# Patient Record
Sex: FEMALE | Race: OTHER | HISPANIC OR LATINO | URBAN - METROPOLITAN AREA
[De-identification: names, ages, dates, MRNs, and addresses within clinical notes are randomized per-mention and may not be internally consistent; named-entity substitution may affect disease eponyms.]

---

## 2018-01-28 ENCOUNTER — INPATIENT (INPATIENT)
Facility: HOSPITAL | Age: 43
LOS: 1 days | Discharge: HOME | DRG: 342 | End: 2018-01-30
Attending: SURGERY | Admitting: SURGERY
Payer: MEDICAID

## 2018-01-28 VITALS
HEART RATE: 100 BPM | DIASTOLIC BLOOD PRESSURE: 82 MMHG | TEMPERATURE: 99 F | SYSTOLIC BLOOD PRESSURE: 153 MMHG | RESPIRATION RATE: 18 BRPM | OXYGEN SATURATION: 98 %

## 2018-01-28 DIAGNOSIS — R10.9 UNSPECIFIED ABDOMINAL PAIN: ICD-10-CM

## 2018-01-28 LAB
ALBUMIN SERPL ELPH-MCNC: 4.1 G/DL — SIGNIFICANT CHANGE UP (ref 3.5–5)
ALP SERPL-CCNC: 139 U/L — HIGH (ref 40–120)
ALT FLD-CCNC: 94 U/L DA — HIGH (ref 10–60)
ANION GAP SERPL CALC-SCNC: 9 MMOL/L — SIGNIFICANT CHANGE UP (ref 5–17)
APPEARANCE UR: CLEAR — SIGNIFICANT CHANGE UP
AST SERPL-CCNC: 46 U/L — HIGH (ref 10–40)
BILIRUB SERPL-MCNC: 0.3 MG/DL — SIGNIFICANT CHANGE UP (ref 0.2–1.2)
BILIRUB UR-MCNC: NEGATIVE — SIGNIFICANT CHANGE UP
BUN SERPL-MCNC: 9 MG/DL — SIGNIFICANT CHANGE UP (ref 7–18)
CALCIUM SERPL-MCNC: 9.2 MG/DL — SIGNIFICANT CHANGE UP (ref 8.4–10.5)
CHLORIDE SERPL-SCNC: 101 MMOL/L — SIGNIFICANT CHANGE UP (ref 96–108)
CO2 SERPL-SCNC: 26 MMOL/L — SIGNIFICANT CHANGE UP (ref 22–31)
COLOR SPEC: YELLOW — SIGNIFICANT CHANGE UP
CREAT SERPL-MCNC: 0.59 MG/DL — SIGNIFICANT CHANGE UP (ref 0.5–1.3)
DIFF PNL FLD: ABNORMAL
GLUCOSE SERPL-MCNC: 92 MG/DL — SIGNIFICANT CHANGE UP (ref 70–99)
GLUCOSE UR QL: NEGATIVE — SIGNIFICANT CHANGE UP
HCG SERPL-ACNC: <1 MIU/ML — SIGNIFICANT CHANGE UP
HCG UR QL: NEGATIVE — SIGNIFICANT CHANGE UP
HCT VFR BLD CALC: 42 % — SIGNIFICANT CHANGE UP (ref 34.5–45)
HGB BLD-MCNC: 13.2 G/DL — SIGNIFICANT CHANGE UP (ref 11.5–15.5)
KETONES UR-MCNC: NEGATIVE — SIGNIFICANT CHANGE UP
LEUKOCYTE ESTERASE UR-ACNC: ABNORMAL
LIDOCAIN IGE QN: 100 U/L — SIGNIFICANT CHANGE UP (ref 73–393)
MCHC RBC-ENTMCNC: 29 PG — SIGNIFICANT CHANGE UP (ref 27–34)
MCHC RBC-ENTMCNC: 31.4 GM/DL — LOW (ref 32–36)
MCV RBC AUTO: 92.4 FL — SIGNIFICANT CHANGE UP (ref 80–100)
NITRITE UR-MCNC: NEGATIVE — SIGNIFICANT CHANGE UP
PH UR: 6 — SIGNIFICANT CHANGE UP (ref 5–8)
PLATELET # BLD AUTO: 371 K/UL — SIGNIFICANT CHANGE UP (ref 150–400)
POTASSIUM SERPL-MCNC: 4.1 MMOL/L — SIGNIFICANT CHANGE UP (ref 3.5–5.3)
POTASSIUM SERPL-SCNC: 4.1 MMOL/L — SIGNIFICANT CHANGE UP (ref 3.5–5.3)
PROT SERPL-MCNC: 8.6 G/DL — HIGH (ref 6–8.3)
PROT UR-MCNC: NEGATIVE — SIGNIFICANT CHANGE UP
RBC # BLD: 4.54 M/UL — SIGNIFICANT CHANGE UP (ref 3.8–5.2)
RBC # FLD: 12.1 % — SIGNIFICANT CHANGE UP (ref 10.3–14.5)
SODIUM SERPL-SCNC: 136 MMOL/L — SIGNIFICANT CHANGE UP (ref 135–145)
SP GR SPEC: 1.02 — SIGNIFICANT CHANGE UP (ref 1.01–1.02)
UROBILINOGEN FLD QL: NEGATIVE — SIGNIFICANT CHANGE UP
WBC # BLD: 12.9 K/UL — HIGH (ref 3.8–10.5)
WBC # FLD AUTO: 12.9 K/UL — HIGH (ref 3.8–10.5)

## 2018-01-28 PROCEDURE — 74176 CT ABD & PELVIS W/O CONTRAST: CPT | Mod: 26

## 2018-01-28 PROCEDURE — 76830 TRANSVAGINAL US NON-OB: CPT | Mod: 26

## 2018-01-28 PROCEDURE — 99285 EMERGENCY DEPT VISIT HI MDM: CPT

## 2018-01-28 PROCEDURE — 76856 US EXAM PELVIC COMPLETE: CPT | Mod: 26

## 2018-01-28 RX ORDER — MORPHINE SULFATE 50 MG/1
4 CAPSULE, EXTENDED RELEASE ORAL ONCE
Qty: 0 | Refills: 0 | Status: DISCONTINUED | OUTPATIENT
Start: 2018-01-28 | End: 2018-01-28

## 2018-01-28 RX ORDER — SODIUM CHLORIDE 9 MG/ML
1000 INJECTION INTRAMUSCULAR; INTRAVENOUS; SUBCUTANEOUS ONCE
Qty: 0 | Refills: 0 | Status: COMPLETED | OUTPATIENT
Start: 2018-01-28 | End: 2018-01-28

## 2018-01-28 RX ORDER — KETOROLAC TROMETHAMINE 30 MG/ML
15 SYRINGE (ML) INJECTION ONCE
Qty: 0 | Refills: 0 | Status: DISCONTINUED | OUTPATIENT
Start: 2018-01-28 | End: 2018-01-28

## 2018-01-28 RX ORDER — HYDROMORPHONE HYDROCHLORIDE 2 MG/ML
1 INJECTION INTRAMUSCULAR; INTRAVENOUS; SUBCUTANEOUS ONCE
Qty: 0 | Refills: 0 | Status: DISCONTINUED | OUTPATIENT
Start: 2018-01-28 | End: 2018-01-28

## 2018-01-28 RX ORDER — MORPHINE SULFATE 50 MG/1
8 CAPSULE, EXTENDED RELEASE ORAL ONCE
Qty: 0 | Refills: 0 | Status: DISCONTINUED | OUTPATIENT
Start: 2018-01-28 | End: 2018-01-28

## 2018-01-28 RX ORDER — IBUPROFEN 200 MG
400 TABLET ORAL EVERY 4 HOURS
Qty: 0 | Refills: 0 | Status: DISCONTINUED | OUTPATIENT
Start: 2018-01-28 | End: 2018-01-30

## 2018-01-28 RX ORDER — ACETAMINOPHEN 500 MG
650 TABLET ORAL EVERY 6 HOURS
Qty: 0 | Refills: 0 | Status: DISCONTINUED | OUTPATIENT
Start: 2018-01-28 | End: 2018-01-30

## 2018-01-28 RX ORDER — ONDANSETRON 8 MG/1
4 TABLET, FILM COATED ORAL ONCE
Qty: 0 | Refills: 0 | Status: COMPLETED | OUTPATIENT
Start: 2018-01-28 | End: 2018-01-28

## 2018-01-28 RX ORDER — HYDROMORPHONE HYDROCHLORIDE 2 MG/ML
1 INJECTION INTRAMUSCULAR; INTRAVENOUS; SUBCUTANEOUS EVERY 4 HOURS
Qty: 0 | Refills: 0 | Status: DISCONTINUED | OUTPATIENT
Start: 2018-01-28 | End: 2018-01-30

## 2018-01-28 RX ADMIN — HYDROMORPHONE HYDROCHLORIDE 1 MILLIGRAM(S): 2 INJECTION INTRAMUSCULAR; INTRAVENOUS; SUBCUTANEOUS at 22:00

## 2018-01-28 RX ADMIN — MORPHINE SULFATE 4 MILLIGRAM(S): 50 CAPSULE, EXTENDED RELEASE ORAL at 16:18

## 2018-01-28 RX ADMIN — MORPHINE SULFATE 4 MILLIGRAM(S): 50 CAPSULE, EXTENDED RELEASE ORAL at 20:00

## 2018-01-28 RX ADMIN — Medication 15 MILLIGRAM(S): at 17:26

## 2018-01-28 RX ADMIN — SODIUM CHLORIDE 1000 MILLILITER(S): 9 INJECTION INTRAMUSCULAR; INTRAVENOUS; SUBCUTANEOUS at 15:55

## 2018-01-28 RX ADMIN — HYDROMORPHONE HYDROCHLORIDE 1 MILLIGRAM(S): 2 INJECTION INTRAMUSCULAR; INTRAVENOUS; SUBCUTANEOUS at 23:14

## 2018-01-28 RX ADMIN — MORPHINE SULFATE 4 MILLIGRAM(S): 50 CAPSULE, EXTENDED RELEASE ORAL at 19:28

## 2018-01-28 RX ADMIN — MORPHINE SULFATE 4 MILLIGRAM(S): 50 CAPSULE, EXTENDED RELEASE ORAL at 15:55

## 2018-01-28 RX ADMIN — Medication 15 MILLIGRAM(S): at 16:18

## 2018-01-28 RX ADMIN — HYDROMORPHONE HYDROCHLORIDE 1 MILLIGRAM(S): 2 INJECTION INTRAMUSCULAR; INTRAVENOUS; SUBCUTANEOUS at 21:06

## 2018-01-28 RX ADMIN — HYDROMORPHONE HYDROCHLORIDE 1 MILLIGRAM(S): 2 INJECTION INTRAMUSCULAR; INTRAVENOUS; SUBCUTANEOUS at 20:26

## 2018-01-28 NOTE — ED PROVIDER NOTE - MEDICAL DECISION MAKING DETAILS
Pt is a 43 y/o F with PMHx of kidney stones presenting with LLQ pain, possible torsion/kidney stone/UC, will check labs, US, and reassess.

## 2018-01-28 NOTE — ED PROVIDER NOTE - OBJECTIVE STATEMENT
Pt is a 43 y/o F with a significant PMHx of kidney stones and no significant PSHx presents to the ED c/o LLQ since yesterday evening. Pt states back pain radiating to the umbilicus. Pt reports nausea but denies v/d other symptoms or complaints. NKDA or allergies:

## 2018-01-28 NOTE — CONSULT NOTE ADULT - PROBLEM SELECTOR RECOMMENDATION 9
a/p 41 y/o with LLQ pain x2 days, stable afebrile  no acute gyn issue  medical mngt as per ER   thank you for consult  patient was advised to make appt with her GYN   d/w Dr. Torres

## 2018-01-28 NOTE — ED ADULT NURSE NOTE - OBJECTIVE STATEMENT
As per pt, "I have been having pain from my belly button to my groin. I also have back pain." Denies urinary problems.

## 2018-01-28 NOTE — ED PROVIDER NOTE - MUSCULOSKELETAL, MLM
Spine appears normal, range of motion is not limited, no muscle or joint tenderness, no leg swelling

## 2018-01-29 ENCOUNTER — RESULT REVIEW (OUTPATIENT)
Age: 43
End: 2018-01-29

## 2018-01-29 ENCOUNTER — TRANSCRIPTION ENCOUNTER (OUTPATIENT)
Age: 43
End: 2018-01-29

## 2018-01-29 DIAGNOSIS — R10.32 LEFT LOWER QUADRANT PAIN: ICD-10-CM

## 2018-01-29 DIAGNOSIS — J81.1 CHRONIC PULMONARY EDEMA: ICD-10-CM

## 2018-01-29 DIAGNOSIS — N20.0 CALCULUS OF KIDNEY: ICD-10-CM

## 2018-01-29 DIAGNOSIS — K35.80 UNSPECIFIED ACUTE APPENDICITIS: ICD-10-CM

## 2018-01-29 DIAGNOSIS — Z29.9 ENCOUNTER FOR PROPHYLACTIC MEASURES, UNSPECIFIED: ICD-10-CM

## 2018-01-29 LAB
ALBUMIN SERPL ELPH-MCNC: 3.6 G/DL — SIGNIFICANT CHANGE UP (ref 3.5–5)
ALP SERPL-CCNC: 121 U/L — HIGH (ref 40–120)
ALT FLD-CCNC: 76 U/L DA — HIGH (ref 10–60)
AMPHET UR-MCNC: NEGATIVE — SIGNIFICANT CHANGE UP
ANION GAP SERPL CALC-SCNC: 8 MMOL/L — SIGNIFICANT CHANGE UP (ref 5–17)
APTT BLD: 27.9 SEC — SIGNIFICANT CHANGE UP (ref 27.5–37.4)
AST SERPL-CCNC: 33 U/L — SIGNIFICANT CHANGE UP (ref 10–40)
BARBITURATES UR SCN-MCNC: NEGATIVE — SIGNIFICANT CHANGE UP
BENZODIAZ UR-MCNC: POSITIVE
BILIRUB SERPL-MCNC: 0.6 MG/DL — SIGNIFICANT CHANGE UP (ref 0.2–1.2)
BUN SERPL-MCNC: 13 MG/DL — SIGNIFICANT CHANGE UP (ref 7–18)
CALCIUM SERPL-MCNC: 8.9 MG/DL — SIGNIFICANT CHANGE UP (ref 8.4–10.5)
CHLORIDE SERPL-SCNC: 100 MMOL/L — SIGNIFICANT CHANGE UP (ref 96–108)
CHOLEST SERPL-MCNC: 182 MG/DL — SIGNIFICANT CHANGE UP (ref 10–199)
CO2 SERPL-SCNC: 26 MMOL/L — SIGNIFICANT CHANGE UP (ref 22–31)
COCAINE METAB.OTHER UR-MCNC: NEGATIVE — SIGNIFICANT CHANGE UP
CREAT SERPL-MCNC: 0.47 MG/DL — LOW (ref 0.5–1.3)
GLUCOSE SERPL-MCNC: 120 MG/DL — HIGH (ref 70–99)
HBA1C BLD-MCNC: 5.6 % — SIGNIFICANT CHANGE UP (ref 4–5.6)
HCT VFR BLD CALC: 37.8 % — SIGNIFICANT CHANGE UP (ref 34.5–45)
HCV AB S/CO SERPL IA: 0.39 S/CO — SIGNIFICANT CHANGE UP
HCV AB SERPL-IMP: SIGNIFICANT CHANGE UP
HDLC SERPL-MCNC: 42 MG/DL — SIGNIFICANT CHANGE UP (ref 40–125)
HGB BLD-MCNC: 11.9 G/DL — SIGNIFICANT CHANGE UP (ref 11.5–15.5)
HIV 1+2 AB+HIV1 P24 AG SERPL QL IA: SIGNIFICANT CHANGE UP
INR BLD: 1.11 RATIO — SIGNIFICANT CHANGE UP (ref 0.88–1.16)
LIPID PNL WITH DIRECT LDL SERPL: 126 MG/DL — SIGNIFICANT CHANGE UP
MAGNESIUM SERPL-MCNC: 1.9 MG/DL — SIGNIFICANT CHANGE UP (ref 1.6–2.6)
MCHC RBC-ENTMCNC: 28.5 PG — SIGNIFICANT CHANGE UP (ref 27–34)
MCHC RBC-ENTMCNC: 31.5 GM/DL — LOW (ref 32–36)
MCV RBC AUTO: 90.5 FL — SIGNIFICANT CHANGE UP (ref 80–100)
METHADONE UR-MCNC: NEGATIVE — SIGNIFICANT CHANGE UP
OPIATES UR-MCNC: NEGATIVE — SIGNIFICANT CHANGE UP
PCP SPEC-MCNC: SIGNIFICANT CHANGE UP
PCP UR-MCNC: NEGATIVE — SIGNIFICANT CHANGE UP
PHOSPHATE SERPL-MCNC: 3.7 MG/DL — SIGNIFICANT CHANGE UP (ref 2.5–4.5)
PLATELET # BLD AUTO: 299 K/UL — SIGNIFICANT CHANGE UP (ref 150–400)
POTASSIUM SERPL-MCNC: 4 MMOL/L — SIGNIFICANT CHANGE UP (ref 3.5–5.3)
POTASSIUM SERPL-SCNC: 4 MMOL/L — SIGNIFICANT CHANGE UP (ref 3.5–5.3)
PROT SERPL-MCNC: 7.7 G/DL — SIGNIFICANT CHANGE UP (ref 6–8.3)
PROTHROM AB SERPL-ACNC: 12.1 SEC — SIGNIFICANT CHANGE UP (ref 9.8–12.7)
RBC # BLD: 4.18 M/UL — SIGNIFICANT CHANGE UP (ref 3.8–5.2)
RBC # FLD: 12.1 % — SIGNIFICANT CHANGE UP (ref 10.3–14.5)
SODIUM SERPL-SCNC: 134 MMOL/L — LOW (ref 135–145)
THC UR QL: NEGATIVE — SIGNIFICANT CHANGE UP
TOTAL CHOLESTEROL/HDL RATIO MEASUREMENT: 4.3 RATIO — SIGNIFICANT CHANGE UP (ref 3.3–7.1)
TRIGL SERPL-MCNC: 70 MG/DL — SIGNIFICANT CHANGE UP (ref 10–149)
TSH SERPL-MCNC: 0.43 UU/ML — SIGNIFICANT CHANGE UP (ref 0.34–4.82)
WBC # BLD: 13.5 K/UL — HIGH (ref 3.8–10.5)
WBC # FLD AUTO: 13.5 K/UL — HIGH (ref 3.8–10.5)

## 2018-01-29 PROCEDURE — 74177 CT ABD & PELVIS W/CONTRAST: CPT | Mod: 26

## 2018-01-29 PROCEDURE — 44970 LAPAROSCOPY APPENDECTOMY: CPT | Mod: AS

## 2018-01-29 PROCEDURE — 44970 LAPAROSCOPY APPENDECTOMY: CPT

## 2018-01-29 PROCEDURE — 88304 TISSUE EXAM BY PATHOLOGIST: CPT | Mod: 26

## 2018-01-29 PROCEDURE — 93010 ELECTROCARDIOGRAM REPORT: CPT

## 2018-01-29 RX ORDER — ONDANSETRON 8 MG/1
4 TABLET, FILM COATED ORAL EVERY 6 HOURS
Qty: 0 | Refills: 0 | Status: DISCONTINUED | OUTPATIENT
Start: 2018-01-29 | End: 2018-01-30

## 2018-01-29 RX ORDER — HYDROMORPHONE HYDROCHLORIDE 2 MG/ML
0.5 INJECTION INTRAMUSCULAR; INTRAVENOUS; SUBCUTANEOUS
Qty: 0 | Refills: 0 | Status: DISCONTINUED | OUTPATIENT
Start: 2018-01-29 | End: 2018-01-29

## 2018-01-29 RX ORDER — CIPROFLOXACIN LACTATE 400MG/40ML
VIAL (ML) INTRAVENOUS
Qty: 0 | Refills: 0 | Status: DISCONTINUED | OUTPATIENT
Start: 2018-01-29 | End: 2018-01-30

## 2018-01-29 RX ORDER — CIPROFLOXACIN LACTATE 400MG/40ML
400 VIAL (ML) INTRAVENOUS EVERY 12 HOURS
Qty: 0 | Refills: 0 | Status: DISCONTINUED | OUTPATIENT
Start: 2018-01-29 | End: 2018-01-30

## 2018-01-29 RX ORDER — SODIUM CHLORIDE 9 MG/ML
1000 INJECTION INTRAMUSCULAR; INTRAVENOUS; SUBCUTANEOUS
Qty: 0 | Refills: 0 | Status: DISCONTINUED | OUTPATIENT
Start: 2018-01-29 | End: 2018-01-30

## 2018-01-29 RX ORDER — HYDROMORPHONE HYDROCHLORIDE 2 MG/ML
1 INJECTION INTRAMUSCULAR; INTRAVENOUS; SUBCUTANEOUS EVERY 4 HOURS
Qty: 0 | Refills: 0 | Status: DISCONTINUED | OUTPATIENT
Start: 2018-01-29 | End: 2018-01-30

## 2018-01-29 RX ORDER — METRONIDAZOLE 500 MG
TABLET ORAL
Qty: 0 | Refills: 0 | Status: DISCONTINUED | OUTPATIENT
Start: 2018-01-29 | End: 2018-01-30

## 2018-01-29 RX ORDER — METRONIDAZOLE 500 MG
500 TABLET ORAL EVERY 8 HOURS
Qty: 0 | Refills: 0 | Status: DISCONTINUED | OUTPATIENT
Start: 2018-01-29 | End: 2018-01-30

## 2018-01-29 RX ORDER — METRONIDAZOLE 500 MG
500 TABLET ORAL ONCE
Qty: 0 | Refills: 0 | Status: COMPLETED | OUTPATIENT
Start: 2018-01-29 | End: 2018-01-29

## 2018-01-29 RX ORDER — HEPARIN SODIUM 5000 [USP'U]/ML
5000 INJECTION INTRAVENOUS; SUBCUTANEOUS EVERY 8 HOURS
Qty: 0 | Refills: 0 | Status: DISCONTINUED | OUTPATIENT
Start: 2018-01-29 | End: 2018-01-30

## 2018-01-29 RX ORDER — CIPROFLOXACIN LACTATE 400MG/40ML
400 VIAL (ML) INTRAVENOUS ONCE
Qty: 0 | Refills: 0 | Status: COMPLETED | OUTPATIENT
Start: 2018-01-29 | End: 2018-01-29

## 2018-01-29 RX ADMIN — Medication 400 MILLIGRAM(S): at 05:25

## 2018-01-29 RX ADMIN — SODIUM CHLORIDE 100 MILLILITER(S): 9 INJECTION INTRAMUSCULAR; INTRAVENOUS; SUBCUTANEOUS at 17:47

## 2018-01-29 RX ADMIN — SODIUM CHLORIDE 100 MILLILITER(S): 9 INJECTION INTRAMUSCULAR; INTRAVENOUS; SUBCUTANEOUS at 05:17

## 2018-01-29 RX ADMIN — HEPARIN SODIUM 5000 UNIT(S): 5000 INJECTION INTRAVENOUS; SUBCUTANEOUS at 19:06

## 2018-01-29 RX ADMIN — Medication 200 MILLIGRAM(S): at 17:41

## 2018-01-29 RX ADMIN — Medication 100 MILLIGRAM(S): at 21:12

## 2018-01-29 RX ADMIN — SODIUM CHLORIDE 100 MILLILITER(S): 9 INJECTION INTRAMUSCULAR; INTRAVENOUS; SUBCUTANEOUS at 21:00

## 2018-01-29 RX ADMIN — Medication 200 MILLIGRAM(S): at 05:18

## 2018-01-29 RX ADMIN — Medication 650 MILLIGRAM(S): at 19:06

## 2018-01-29 RX ADMIN — Medication 650 MILLIGRAM(S): at 17:46

## 2018-01-29 RX ADMIN — Medication 100 MILLIGRAM(S): at 05:18

## 2018-01-29 RX ADMIN — Medication 400 MILLIGRAM(S): at 06:34

## 2018-01-29 NOTE — H&P ADULT - NSHPLABSRESULTS_GEN_ALL_CORE
Vital Signs Last 24 Hrs  T(C): 37.3 (28 Jan 2018 15:02), Max: 37.3 (28 Jan 2018 15:02)  T(F): 99.1 (28 Jan 2018 15:02), Max: 99.1 (28 Jan 2018 15:02)  HR: 100 (28 Jan 2018 15:02) (100 - 100)  BP: 153/82 (28 Jan 2018 15:02) (153/82 - 153/82)  BP(mean): --  RR: 18 (28 Jan 2018 15:02) (18 - 18)  SpO2: 98% (28 Jan 2018 15:02) (98% - 98%)      Labs:                        13.2   12.9  )-----------( 371      ( 28 Jan 2018 15:50 )             42.0     01-28    136  |  101  |  9   ----------------------------<  92  4.1   |  26  |  0.59    Ca    9.2      28 Jan 2018 15:50    TPro  8.6<H>  /  Alb  4.1  /  TBili  0.3  /  DBili  x   /  AST  46<H>  /  ALT  94<H>  /  AlkPhos  139<H>  01-28        < from: CT Abdomen and Pelvis No Cont (01.28.18 @ 19:38) >    FINDINGS:    LOWER CHEST: Diffuse groundglass opacities in both lower lobes likely   pulmonary edema.    LIVER: Hepatic steatosis. No focal abnormality.  BILE DUCTS: Normal caliber.  GALLBLADDER: Within normal limits.  SPLEEN: Within normal limits.  PANCREAS: Within normal limits.  ADRENALS: Within normal limits.  KIDNEYS/URETERS: A few nonobstructing renal stones (2 in the right, one   in the left, measuring 2 to 3 mm):   BLADDER: Within normal limits.  REPRODUCTIVE ORGANS: Within normal limits.     BOWEL: No bowel obstruction. Appendix  is not well visualized.      PERITONEUM: No ascites.  VESSELS:  Within normal limits.  RETROPERITONEUM: No lymphadenopathy.    ABDOMINAL WALL: Within normal limits.  BONES: Within normal limits.      < end of copied text >

## 2018-01-29 NOTE — H&P ADULT - PROBLEM SELECTOR PLAN 1
Likely from ovarian cyst and/or less likely kidney stone   and/or mid cycle pain from endometriosis.  OBGYN consulted.   No acute intervention and outpatient follow up as per ObGyn  Pain management and IV hydration.  CT abd without contrast with bilateral kidney stone and bilateral ovarian cyst.   Transvaginal and abd US with ovary cyst.   Will do CT abd and pelvis with contrast, will send urine toxicology.

## 2018-01-29 NOTE — ED ADULT NURSE REASSESSMENT NOTE - NS ED NURSE REASSESS COMMENT FT1
patient received for  minutes from night shift. no acute distress. endorsed to rn in holding nash esquivel

## 2018-01-29 NOTE — H&P ADULT - NSHPREVIEWOFSYSTEMS_GEN_ALL_CORE
REVIEW OF SYSTEMS:    CONSTITUTIONAL: No weakness, fevers or chills  EYES/ENT: No visual changes;  No vertigo or throat pain   NECK: No pain or stiffness  RESPIRATORY: No cough, wheezing, hemoptysis; No shortness of breath  CARDIOVASCULAR: No chest pain or palpitations  GASTROINTESTINAL: Abdominal pain with nausea, vomiting, after pain medication. No hematemesis; No diarrhea or constipation. No melena or hematochezia.  GENITOURINARY: No dysuria, frequency or hematuria  NEUROLOGICAL: No numbness or weakness  SKIN: No itching, rashes  No other complaint except mentioned as above.

## 2018-01-29 NOTE — CONSULT NOTE ADULT - ASSESSMENT
pt with no hx of cad for possible appendectomy  check ecg  if ecg is normal pt is clear cardiac wise for surgery in view of no cardiac symtomps  dvt prophylaxis
a/p 43 y/o with LLQ pain x2 days, stable afebrile  no acute gyn issue  medical mngt as per ER   thank you for consult  patient was advised to make appt with her GYN   d/w Dr. Torres

## 2018-01-29 NOTE — H&P ADULT - ASSESSMENT
42 year old female with PMH of ovarian cyst, kidneys stones and PSH of fibroid surgery 6 years ago presented with LLQ pain today

## 2018-01-29 NOTE — H&P ADULT - PROBLEM SELECTOR PLAN 3
mild bilateral pulmonary edema found on CT scan without SOB and no sign of fluid overload  Will do Echo for bilateral pul edema.

## 2018-01-29 NOTE — CHART NOTE - NSCHARTNOTEFT_GEN_A_CORE
Spoke with radiologist and patient  repeat CT scan with IV contrast positive for appendicitis without abscess or perforation.   Surgery attempt to reach.  Cipro and Flagyl started with IV hydration. Vital stable. Spoke with radiologist and patient  repeat CT scan with IV contrast positive for appendicitis without abscess or perforation.   Discussed with Dr Brower. Pre op lab sent. Patient is in NPO  Cipro and Flagyl started with IV hydration. Vital stable.

## 2018-01-29 NOTE — BRIEF OPERATIVE NOTE - PROCEDURE
<<-----Click on this checkbox to enter Procedure Laparoscopic appendectomy  01/29/2018    Active  ROLAND

## 2018-01-29 NOTE — H&P ADULT - HISTORY OF PRESENT ILLNESS
42 year old female with PMH of ovarian cyst, kidneys stones and PSH of fibroid surgery 6 years ago presented with LLQ pain today. Pain is 10/10 started on belly button move from left LLQ to RRQ aching in quality. Patient woke up due to pain this morning and pain has been persistent.  Patient has similar pain in the past multiple times but never that severe. Patient has irregular menstrual history; with last menstruation On Jan 18 but never a heavy menstruation. Denied any recent travel, eating out, fever, chest pain, urinary or bowel problem.   In ED patient received multiple Dilaudid which make her nauseated and vomited 4 times. CT abd without contrast with bilateral kidney stones and bilateral ovarian cyst.

## 2018-01-29 NOTE — CONSULT NOTE ADULT - SUBJECTIVE AND OBJECTIVE BOX
CHIEF COMPLAINT:Patient is a 42y old  Female who presents with a chief complaint of LLQ pain (29 Jan 2018 00:19)      HPI:  42 year old female with PMH of ovarian cyst, kidneys stones and PSH of fibroid surgery 6 years ago presented with LLQ pain today. Pain is 10/10 started on belly button move from left LLQ to RRQ aching in quality. Patient woke up due to pain this morning and pain has been persistent.  Patient has similar pain in the past multiple times but never that severe. Patient has irregular menstrual history; with last menstruation On Jan 18 but never a heavy menstruation. Denied any recent travel, eating out, fever, chest pain, urinary or bowel problem.   In ED patient received multiple Dilaudid which make her nauseated and vomited 4 times. CT abd without contrast with bilateral kidney stones and bilateral ovarian cyst. (29 Jan 2018 00:19)  pt denies of any chest pain ,active with no cardiac complain.    PAST MEDICAL & SURGICAL HISTORY:  Kidney stone  No significant past surgical history      MEDICATIONS  (STANDING):  ciprofloxacin   IVPB      ciprofloxacin   IVPB 400 milliGRAM(s) IV Intermittent every 12 hours  metroNIDAZOLE  IVPB      metroNIDAZOLE  IVPB 500 milliGRAM(s) IV Intermittent every 8 hours  sodium chloride 0.9%. 1000 milliLiter(s) (100 mL/Hr) IV Continuous <Continuous>    MEDICATIONS  (PRN):  acetaminophen   Tablet. 650 milliGRAM(s) Oral every 6 hours PRN Mild Pain (1 - 3)  HYDROmorphone   Tablet 1 milliGRAM(s) Oral every 4 hours PRN Severe Pain (7 - 10)  ibuprofen  Tablet 400 milliGRAM(s) Oral every 4 hours PRN moderate pain      FAMILY HISTORY:  No pertinent family history in first degree relatives      SOCIAL HISTORY:    [ ] Non-smoker  [ ] Smoker  [ ] Alcohol    Allergies    No Known Allergies    Intolerances    	    REVIEW OF SYSTEMS:  CONSTITUTIONAL: No fever, weight loss, or fatigue  EYES: No eye pain, visual disturbances, or discharge  ENT:  No difficulty hearing, tinnitus, vertigo; No sinus or throat pain  NECK: No pain or stiffness  RESPIRATORY: No cough, wheezing, chills or hemoptysis; No Shortness of Breath  CARDIOVASCULAR: No chest pain, palpitations, passing out, dizziness, or leg swelling  GASTROINTESTINAL:+ llq  abdominal pain no  epigastric pain. No nausea, vomiting, or hematemesis; No diarrhea or constipation. No melena or hematochezia.  GENITOURINARY: No dysuria, frequency, hematuria, or incontinence  NEUROLOGICAL: No headaches, memory loss, loss of strength, numbness, or tremors  SKIN: No itching, burning, rashes, or lesions   LYMPH Nodes: No enlarged glands  ENDOCRINE: No heat or cold intolerance; No hair loss  MUSCULOSKELETAL: No joint pain or swelling; No muscle, back, or extremity pain  PSYCHIATRIC: No depression, anxiety, mood swings, or difficulty sleeping  HEME/LYMPH: No easy bruising, or bleeding gums  ALLERGY AND IMMUNOLOGIC: No hives or eczema	    [ ] All others negative	  [ ] Unable to obtain    PHYSICAL EXAM:  T(C): 36.9 (01-29-18 @ 08:35), Max: 37.3 (01-28-18 @ 15:02)  HR: 90 (01-29-18 @ 08:35) (90 - 102)  BP: 111/65 (01-29-18 @ 08:35) (111/65 - 153/82)  RR: 18 (01-29-18 @ 08:35) (16 - 18)  SpO2: 98% (01-29-18 @ 08:35) (98% - 99%)  Wt(kg): --  I&O's Summary      Appearance: Normal	  HEENT:   Normal oral mucosa, PERRL, EOMI	  Lymphatic: No lymphadenopathy  Cardiovascular: Normal S1 S2, No JVD, No murmurs, No edema  Respiratory: Lungs clear to auscultation	  Psychiatry: A & O x 3, Mood & affect appropriate  Gastrointestinal:  Soft, + llq tenderness,  + BS	  Skin: No rashes, No ecchymoses, No cyanosis	  Neurologic: Non-focal  Extremities: Normal range of motion, No clubbing, cyanosis or edema  Vascular: Peripheral pulses palpable 2+ bilaterally    TELEMETRY: 	    ECG:  	  RADIOLOGY:  OTHER: 	  	  LABS:	 	    CARDIAC MARKERS:                              11.9   13.5  )-----------( 299      ( 29 Jan 2018 05:09 )             37.8     01-29    134<L>  |  100  |  13  ----------------------------<  120<H>  4.0   |  26  |  0.47<L>    Ca    8.9      29 Jan 2018 05:09  Phos  3.7     01-29  Mg     1.9     01-29    TPro  7.7  /  Alb  3.6  /  TBili  0.6  /  DBili  x   /  AST  33  /  ALT  76<H>  /  AlkPhos  121<H>  01-29    proBNP:   Lipid Profile: Cholesterol 182    HDL 42  TG 70    HgA1c:   TSH: Thyroid Stimulating Hormone, Serum: 0.43 uU/mL (01-29 @ 06:07    :
GYN PA Consult Note     43 y/o LMP  P1 presented with c/o LLQ pain x2days described as pelvic pressure 10/10 on and off, non radiating, no alleviating or no aggravating factors. Patient states she has h/o UTI and kidney stone. Patient denies dysuria, hematuria, vaginal bleeding, vaginal discharge, fever, chills, n.v.  pobx  2000 boy FT uncomplicated  pgyn DR. Torres GYN last visit 3yrs ago endometriosis s/p dx laparoscopy , ovarian cyst, uterine fibroid  pmhx kidney stone  psx diagnostic laparoscopy   meds denies  allergies denies    Vital Signs Last 24 Hrs  T(C): 37.3 (2018 15:02), Max: 37.3 (2018 15:02)  T(F): 99.1 (2018 15:02), Max: 99.1 (2018 15:02)  HR: 100 (2018 15:02) (100 - 100)  BP: 153/82 (2018 15:02) (153/82 - 153/82)  BP(mean): --  RR: 18 (2018 15:02) (18 - 18)  SpO2: 98% (2018 15:02) (98% - 98%)    gen aox3, not in acute distress, non toxic appering  abd obese, soft, non distended, no guarding no rebound, mild tenderness in pelvic region, left sided CVA tenderness  bimanual exam no CMT tenderness, no adnexa tenderness b/l                          13.2   12.9  )-----------( 371      ( 2018 15:50 )             42.0       HCG Quantitative, Serum (18 @ 15:50)    HCG Quantitative, Serum: <1: GROUP        REFERENCE RANGE  non-pregnant females   ages 18-62          1-3 mIU/mL   adult males      ages 19-67                        <1   mIU/mL  hCG Levels with Gestational Age  0.2-1 week   5-50 mIU/mL  1-2 weeks    mIU/mL  2-3 weeks   100-5,000 mIU/mL  4-5 weeks   1,000-50,000 mIU/mL  5-6 weeks   10,000-100,000 mIU/mL  6-8 weeks   15,000-200,000 mIU/mL  2-3 months   10,000-100,000 mIU/mL  The above table provides only a very rough estimate of gestational age  and should be used only in conjunction with other methods for  establishing gestational age. mIU/mL      < from: CT Abdomen and Pelvis No Cont (18 @ 19:38) >  PROCEDURE:   Contiguous axial scan of the abdomen and pelvis without intravenous or   oral contrast, followed by coronal and sagittal reformation.      FINDINGS:    LOWER CHEST: Diffuse groundglass opacities in both lower lobes likely   pulmonary edema.    LIVER: Hepatic steatosis. No focal abnormality.  BILE DUCTS: Normal caliber.  GALLBLADDER: Within normal limits.  SPLEEN: Within normal limits.  PANCREAS: Within normal limits.  ADRENALS: Within normal limits.  KIDNEYS/URETERS: A few nonobstructing renal stones (2 in the right, one   in the left, measuring 2 to 3 mm):   BLADDER: Within normal limits.  REPRODUCTIVE ORGANS: Within normal limits.     BOWEL: No bowel obstruction. Appendix  is not well visualized.      PERITONEUM: No ascites.  VESSELS:  Within normal limits.  RETROPERITONEUM: No lymphadenopathy.    ABDOMINAL WALL: Within normal limits.  BONES: Within normal limits.    IMPRESSION: Nonobstructing bilateral renal stones. No hydronephrosis.    < end of copied text >      < from: US Pelvis Complete (18 @ 16:52) >  FINDINGS:    Uterus: 10.0 x 4.4 x 6.4 cm. There is a broad-based subserosal fundal   fibroid measuring 1.1 x 1.1 x 1.6 cm as well as a an intramural fibroid   within the left anterior uterine body measuring 1.1 x 1.0 x 1.0 cm.    Endometrium: 8 mm. Within normal limits.    Right ovary: 3.4 x 2.4 x 2.3 cm. Within normal limits.    Left ovary: 4.2x 3.5 x 3.4 cm and containing a 3.2 cm cyst. Within   normal limits.    Ovarian parenchymal flow seen with color and spectral Doppler.    Fluid: None.    IMPRESSION:    Small uterine fibroids.    3.2 cm left ovarian cyst. 6 week follow-up pelvic ultrasound is   recommended to assure resolution.    < end of copied text >
HPI:  42 year old female with PMH of ovarian cyst, kidneys stones and PSH of fibroid surgery 6 years ago presented with LLQ pain today. Pain is 10/10 started on belly button move from left LLQ to RLQ aching in quality. Patient woke up due to pain this morning and pain has been persistent.  Patient has similar pain in the past multiple times but never that severe. Patient has irregular menstrual history; with last menstruation On  but never a heavy menstruation. Denied any recent travel, eating out, fever, chest pain, urinary or bowel problem.   In ED patient received multiple Dilaudid which make her nauseated and vomited 4 times. CT abd without contrast with bilateral kidney stones and bilateral ovarian cyst. (2018 00:19)      PAST MEDICAL & SURGICAL HISTORY:  Kidney stone  No significant past surgical history      MEDICATIONS  (STANDING):  ciprofloxacin   IVPB      ciprofloxacin   IVPB 400 milliGRAM(s) IV Intermittent every 12 hours  metroNIDAZOLE  IVPB      metroNIDAZOLE  IVPB 500 milliGRAM(s) IV Intermittent every 8 hours  sodium chloride 0.9%. 1000 milliLiter(s) (100 mL/Hr) IV Continuous <Continuous>    MEDICATIONS  (PRN):  acetaminophen   Tablet. 650 milliGRAM(s) Oral every 6 hours PRN Mild Pain (1 - 3)  HYDROmorphone   Tablet 1 milliGRAM(s) Oral every 4 hours PRN Severe Pain (7 - 10)  ibuprofen  Tablet 400 milliGRAM(s) Oral every 4 hours PRN moderate pain      Allergies    No Known Allergies    Intolerances        SOCIAL HISTORY:  No drug use    FAMILY HISTORY:  No pertinent family history in first degree relatives      REVIEW OF SYSTEMS:  CONSTITUTIONAL: In no acute distress.  EYES: No eye pain, visual disturbances, or discharge  ENMT:  No difficulty hearing, tinnitus, vertigo; No sinus or throat pain  NECK: No pain or stiffness  BREASTS: No pain, masses, or nipple discharge  RESPIRATORY: No cough, wheezing, chills or hemoptysis; No shortness of breath  CARDIOVASCULAR: No chest pain, palpitations, dizziness, or leg swelling  GASTROINTESTINAL: abdominal  pain.  GENITOURINARY: No dysuria, frequency, hematuria, or incontinence  NEUROLOGICAL: No headaches, memory loss, loss of strength, numbness, or tremors  SKIN: No itching, burning, rashes, or lesions   LYMPH NODES: No enlarged glands  ENDOCRINE: No heat or cold intolerance; No hair loss  MUSCULOSKELETAL: No joint pain or swelling; No muscle, back, or extremity pain  PSYCHIATRIC: No depression, anxiety, mood swings, or difficulty sleeping  HEME/LYMPH: No easy bruising, or bleeding gums  ALLERGY AND IMMUNOLOGIC: No hives or eczema      Vital Signs Last 24 Hrs  T(C): 36.9 (2018 01:27), Max: 37.3 (2018 15:02)  T(F): 98.4 (:), Max: 99.1 (2018 15:02)  HR: 97 (:) (97 - 100)  BP: 120/72 (:) (120/72 - 153/82)  BP(mean): --  RR: 18 (:) (18 - 18)  SpO2: 98% (:) (98% - 98%)    Daily     Daily     PHYSICAL EXAM:  GENERAL: NAD, well-groomed, well-developed  HEAD:  Atraumatic, Normocephalic  EYES: EOMI, PERRL, conjunctiva and sclera clear  ENMT: Moist mucous membranes, Good dentition, No lesions  NECK: Supple, No JVD  NERVOUS SYSTEM:  Alert & Oriented X3, Good concentration  CHEST/LUNG: Clear to percussion bilaterally; Normal respiratory effort  HEART: Regular rate and rhythm  ABDOMEN: Soft, RLQ and LLQ tender, Nondistended; Bowel sounds present  : normal external genitalia  BREASTS: no breast lumps  EXTREMITIES:  No clubbing, cyanosis, or edema  VASC: equal peripheral pulses  LYMPH: No lymphadenopathy noted  SKIN: No rashes or lesions  PSYCH: normal affect    LABS:                        13.2   12.9  )-----------( 371      ( 2018 15:50 )             42.0     Neutrophil %:       136  |  101  |  9   ----------------------------<  92  4.1   |  26  |  0.59    Ca    9.2      2018 15:50    TPro  8.6<H>  /  Alb  4.1  /  TBili  0.3  /  DBili  x   /  AST  46<H>  /  ALT  94<H>  /  AlkPhos  139<H>        Urinalysis Basic - ( 2018 15:50 )    Color: Yellow / Appearance: Clear / S.020 / pH: x  Gluc: x / Ketone: Negative  / Bili: Negative / Urobili: Negative   Blood: x / Protein: Negative / Nitrite: Negative   Leuk Esterase: Trace / RBC: 5-10 /HPF / WBC 0-2 /HPF   Sq Epi: x / Non Sq Epi: Few /HPF / Bacteria: Few /HPF        RADIOLOGY & ADDITIONAL STUDIES:  < from: CT Abdomen and Pelvis w/ IV Cont (18 @ 04:30) >  The large and small bowel are normal in caliber without obstruction. The   appendix is dilated to 1.1 cm, thick-walled and has surrounding   inflammatory change. There is no free air or abdominal abscess. There is   no additional bowel wall thickening.    The liver, gallbladder, spleen, pancreas and adrenal glands are   unremarkable aside from fatty infiltration of the liver.  The kidneys   enhance symmetrically without hydronephrosis. There are bilateral   nonobstructing tiny intrarenal calculi, unchanged. Subcentimeter right   renal cyst is also stable.    The abdominal aorta is normal in caliber. There is no retroperitoneal,   pelvic or inguinal adenopathy. There is no ascites.    The urinary bladder, uterus and right ovary are unremarkable aside from a   uterine fundal fibroid. There is a 3.4 cm left adnexal cyst.    The visualized osseous structures demonstrate no acute abnormality.    IMPRESSION:   Acute nonperforated appendicitis.    < end of copied text >

## 2018-01-29 NOTE — H&P ADULT - NSHPPHYSICALEXAM_GEN_ALL_CORE
PHYSICAL EXAM:    GENERAL: NAD, well-developed    HEAD:  Atraumatic, Normocephalic    EYES: EOMI, PERRLA, conjunctiva and sclera clear    NECK: Supple, No JVD    CHEST/LUNG: Clear to auscultation bilaterally; No wheeze    HEART: Regular rate and rhythm; No murmurs, rubs, or gallops    ABDOMEN: Soft, moderately tender LLQ and mildly tender RLQ, Nondistended; Bowel sounds present in low intensity    EXTREMITIES:  2+ Peripheral Pulses, No clubbing, cyanosis, or edema    PSYCH: AAOx3    NEUROLOGY: non-focal    SKIN: No rashes or lesions    No other pertinent positive finding except mentioned as above.

## 2018-01-30 ENCOUNTER — TRANSCRIPTION ENCOUNTER (OUTPATIENT)
Age: 43
End: 2018-01-30

## 2018-01-30 VITALS
HEART RATE: 87 BPM | TEMPERATURE: 99 F | OXYGEN SATURATION: 99 % | SYSTOLIC BLOOD PRESSURE: 115 MMHG | DIASTOLIC BLOOD PRESSURE: 67 MMHG | RESPIRATION RATE: 16 BRPM

## 2018-01-30 LAB
ANION GAP SERPL CALC-SCNC: 8 MMOL/L — SIGNIFICANT CHANGE UP (ref 5–17)
BASOPHILS # BLD AUTO: 0 K/UL — SIGNIFICANT CHANGE UP (ref 0–0.2)
BASOPHILS NFR BLD AUTO: 0.3 % — SIGNIFICANT CHANGE UP (ref 0–2)
BUN SERPL-MCNC: 9 MG/DL — SIGNIFICANT CHANGE UP (ref 7–18)
CALCIUM SERPL-MCNC: 8.8 MG/DL — SIGNIFICANT CHANGE UP (ref 8.4–10.5)
CHLORIDE SERPL-SCNC: 106 MMOL/L — SIGNIFICANT CHANGE UP (ref 96–108)
CO2 SERPL-SCNC: 25 MMOL/L — SIGNIFICANT CHANGE UP (ref 22–31)
CREAT SERPL-MCNC: 0.55 MG/DL — SIGNIFICANT CHANGE UP (ref 0.5–1.3)
EOSINOPHIL # BLD AUTO: 0 K/UL — SIGNIFICANT CHANGE UP (ref 0–0.5)
EOSINOPHIL NFR BLD AUTO: 0 % — SIGNIFICANT CHANGE UP (ref 0–6)
GLUCOSE SERPL-MCNC: 131 MG/DL — HIGH (ref 70–99)
HCT VFR BLD CALC: 34.4 % — LOW (ref 34.5–45)
HGB BLD-MCNC: 11.3 G/DL — LOW (ref 11.5–15.5)
LYMPHOCYTES # BLD AUTO: 1.2 K/UL — SIGNIFICANT CHANGE UP (ref 1–3.3)
LYMPHOCYTES # BLD AUTO: 10.1 % — LOW (ref 13–44)
MCHC RBC-ENTMCNC: 30.1 PG — SIGNIFICANT CHANGE UP (ref 27–34)
MCHC RBC-ENTMCNC: 32.9 GM/DL — SIGNIFICANT CHANGE UP (ref 32–36)
MCV RBC AUTO: 91.4 FL — SIGNIFICANT CHANGE UP (ref 80–100)
MONOCYTES # BLD AUTO: 0.6 K/UL — SIGNIFICANT CHANGE UP (ref 0–0.9)
MONOCYTES NFR BLD AUTO: 5.5 % — SIGNIFICANT CHANGE UP (ref 2–14)
NEUTROPHILS # BLD AUTO: 9.6 K/UL — HIGH (ref 1.8–7.4)
NEUTROPHILS NFR BLD AUTO: 84.1 % — HIGH (ref 43–77)
PLATELET # BLD AUTO: 303 K/UL — SIGNIFICANT CHANGE UP (ref 150–400)
POTASSIUM SERPL-MCNC: 3.7 MMOL/L — SIGNIFICANT CHANGE UP (ref 3.5–5.3)
POTASSIUM SERPL-SCNC: 3.7 MMOL/L — SIGNIFICANT CHANGE UP (ref 3.5–5.3)
RBC # BLD: 3.76 M/UL — LOW (ref 3.8–5.2)
RBC # FLD: 11.9 % — SIGNIFICANT CHANGE UP (ref 10.3–14.5)
SODIUM SERPL-SCNC: 139 MMOL/L — SIGNIFICANT CHANGE UP (ref 135–145)
WBC # BLD: 11.5 K/UL — HIGH (ref 3.8–10.5)
WBC # FLD AUTO: 11.5 K/UL — HIGH (ref 3.8–10.5)

## 2018-01-30 RX ADMIN — Medication 200 MILLIGRAM(S): at 05:25

## 2018-01-30 RX ADMIN — Medication 100 MILLIGRAM(S): at 05:24

## 2018-01-30 NOTE — PROGRESS NOTE ADULT - ASSESSMENT
42F s/p lap appendectomy for acute suppurative appendicitis. Afebrile and stable.  1) advance diet as tolerated  20 D/C later today if tolerates--no abx upon discharge

## 2018-01-30 NOTE — DISCHARGE NOTE ADULT - MEDICATION SUMMARY - MEDICATIONS TO TAKE
I will START or STAY ON the medications listed below when I get home from the hospital:    Percocet 5/325 oral tablet  -- 1 tab(s) by mouth every 6 hours, As Needed -for moderate pain MDD:4   -- Caution federal law prohibits the transfer of this drug to any person other  than the person for whom it was prescribed.  May cause drowsiness.  Alcohol may intensify this effect.  Use care when operating dangerous machinery.  This prescription cannot be refilled.  This product contains acetaminophen.  Do not use  with any other product containing acetaminophen to prevent possible liver damage.  Using more of this medication than prescribed may cause serious breathing problems.    -- Indication: For Pain     omeprazole 40 mg oral delayed release capsule  -- 1 cap(s) by mouth once a day  -- It is very important that you take or use this exactly as directed.  Do not skip doses or discontinue unless directed by your doctor.  Obtain medical advice before taking any non-prescription drugs as some may affect the action of this medication.  Swallow whole.  Do not crush.    -- Indication: For As directed

## 2018-01-30 NOTE — DISCHARGE NOTE ADULT - HOSPITAL COURSE
42 year old female with PMH of ovarian cyst, kidneys stones and PSH of fibroid surgery 6 years ago presented with LLQ pain today. Pain is 10/10 started on belly button move from left LLQ to RLQ aching in quality. Patient woke up due to pain this morning and pain has been persistent.  Patient has similar pain in the past multiple times but never that severe. Patient has irregular menstrual history; with last menstruation On Jan 18 but never a heavy menstruation. Denied any recent travel, eating out, fever, chest pain, urinary or bowel problem.   In ED patient received multiple Dilaudid which make her nauseated and vomited 4 times. CT abd/ pelvis with IV contrast Acute nonperforated appendicitis.  Pt underwent Lap elidia w/ Dr Quiroz on 1/29/2018, post operative period uncomplicated, pt tolerating regular diet and stable for discharge. Pt to follow up with Dr Quiroz in office.

## 2018-01-30 NOTE — DISCHARGE NOTE ADULT - CARE PLAN
Principal Discharge DX:	Acute appendicitis, uncomplicated  Goal:	wound healing  Assessment and plan of treatment:	Follow up with Dr. Quiroz in 1 week in office, eat healthy and well balanced diet

## 2018-01-30 NOTE — PROGRESS NOTE ADULT - SUBJECTIVE AND OBJECTIVE BOX
Post Operative Day #: 0    SUBJECTIVE:42yFemale s/p lap appy; comfortable, voided    Flatus:  na          Bowel Movement: na    Pain Control Adequate: y    Nausea: n          Vomiting: n    Diet: karen clears      MEDICATIONS  (STANDING):  ciprofloxacin   IVPB      ciprofloxacin   IVPB 400 milliGRAM(s) IV Intermittent every 12 hours  heparin  Injectable 5000 Unit(s) SubCutaneous every 8 hours  metroNIDAZOLE  IVPB 500 milliGRAM(s) IV Intermittent every 8 hours  metroNIDAZOLE  IVPB      sodium chloride 0.9%. 1000 milliLiter(s) (100 mL/Hr) IV Continuous <Continuous>    MEDICATIONS  (PRN):  acetaminophen   Tablet. 650 milliGRAM(s) Oral every 6 hours PRN Mild Pain (1 - 3)  HYDROmorphone   Tablet 1 milliGRAM(s) Oral every 4 hours PRN Severe Pain (7 - 10)  HYDROmorphone  Injectable 1 milliGRAM(s) IV Push every 4 hours PRN Severe Pain  ibuprofen  Tablet 400 milliGRAM(s) Oral every 4 hours PRN moderate pain  ondansetron Injectable 4 milliGRAM(s) IV Push every 6 hours PRN Nausea      OBJECTIVE:  Vital Signs Last 24 Hrs  T(C): 36.7 (29 Jan 2018 22:08), Max: 37.1 (29 Jan 2018 16:01)  T(F): 98 (29 Jan 2018 22:08), Max: 98.7 (29 Jan 2018 16:01)  HR: 93 (29 Jan 2018 22:08) (79 - 102)  BP: 103/61 (29 Jan 2018 22:08) (103/61 - 120/80)  BP(mean): 89 (29 Jan 2018 15:28) (81 - 92)  RR: 16 (29 Jan 2018 22:08) (12 - 20)  SpO2: 99% (29 Jan 2018 22:08) (95% - 100%)  I&O's Detail    29 Jan 2018 07:01  -  30 Jan 2018 01:47  --------------------------------------------------------  IN:    Lactated Ringers IV Bolus: 700 mL    sodium chloride 0.9%.: 700 mL  Total IN: 1400 mL    OUT:  Total OUT: 0 mL    Total NET: 1400 mL                                11.9   13.5  )-----------( 299      ( 29 Jan 2018 05:09 )             37.8     29 Jan 2018 05:09    134    |  100    |  13     ----------------------------<  120    4.0     |  26     |  0.47   28 Jan 2018 15:50    136    |  101    |  9      ----------------------------<  92     4.1     |  26     |  0.59     Ca    8.9        29 Jan 2018 05:09  Ca    9.2        28 Jan 2018 15:50  Phos  3.7       29 Jan 2018 06:07  Mg     1.9       29 Jan 2018 06:07    TPro  7.7    /  Alb  3.6    /  TBili  0.6    /  DBili  x      /  AST  33     /  ALT  76     /  AlkPhos  121    29 Jan 2018 05:09  TPro  8.6    /  Alb  4.1    /  TBili  0.3    /  DBili  x      /  AST  46     /  ALT  94     /  AlkPhos  139    28 Jan 2018 15:50    PT/INR - ( 29 Jan 2018 09:12 )   PT: 12.1 sec;   INR: 1.11 ratio         PTT - ( 29 Jan 2018 09:12 )  PTT:27.9 sec    Physical Exam:    Abdomen: soft, NT, dsg/inc CDI    Extremities: no c/c/e    stable post-op

## 2018-01-30 NOTE — DISCHARGE NOTE ADULT - OTHER SIGNIFICANT FINDINGS
< from: CT Abdomen and Pelvis w/ IV Cont (01.29.18 @ 04:30) >  PROCEDURE DATE:  01/29/2018          INTERPRETATION:  CLINICAL HISTORY: Abdominal and pelvic pain.    TECHNIQUE:  CT scan of the abdomen and pelvis with IV contrast.  Transaxial images wereacquired from the domes of the diaphragm to the   symphysis pubis with intravenous contrast. Oral contrast was with pelvis   per the referring clinician's request. Coronal and sagittal images were   also provided from the transaxial source data. 90mLs of Omnipaque 350 was   administered intravenously without complication and 10 mLs was discarded.    COMPARISON: CT of the abdomen and pelvis from 1/28/2018    FINDINGS:   The heart is not enlarged. The lung bases are clear.     The large and small bowel are normal in caliber without obstruction. The   appendix is dilated to 1.1 cm, thick-walled and has surrounding   inflammatory change. There is no free air or abdominal abscess. There is   no additional bowel wall thickening.    The liver, gallbladder, spleen, pancreas and adrenal glands are   unremarkable aside from fatty infiltration of the liver.  The kidneys   enhance symmetrically without hydronephrosis. There are bilateral   nonobstructing tiny intrarenal calculi, unchanged. Subcentimeter right   renal cyst is also stable.    The abdominal aorta is normal in caliber. There is no retroperitoneal,   pelvic or inguinal adenopathy. There is no ascites.    The urinary bladder, uterus and right ovary are unremarkable aside from a   uterine fundal fibroid. There is a 3.4 cm left adnexal cyst.    The visualized osseous structures demonstrate no acute abnormality.    IMPRESSION:   Acute nonperforated appendicitis.    I discussed the findings with Dr. Powell at 4:10 AM on 1/29/2018 with read   back verification.    < end of copied text >

## 2018-01-30 NOTE — PROGRESS NOTE ADULT - SUBJECTIVE AND OBJECTIVE BOX
SUBJECTIVE    Nausea [ ] YES [X ] NO  Vomiting [ ] YES [X ] NO  Voiding normally [X ] YES [ ] NO  Flatus [ ] YES [X ] NO  BM [ ] YES [X ] NO  Pain under control [X ] YES [ ] NO  Tolerating clears  Ambulated [X ] YES NO [ ]  Using Incentive Spirometer [X ] YES [ ] NO      VITALS    ICU Vital Signs Last 24 Hrs  T(C): 37.1 (30 Jan 2018 05:50), Max: 37.1 (29 Jan 2018 16:01)  T(F): 98.8 (30 Jan 2018 05:50), Max: 98.8 (30 Jan 2018 05:50)  HR: 80 (30 Jan 2018 05:50) (79 - 96)  BP: 111/63 (30 Jan 2018 05:50) (103/61 - 120/80)  BP(mean): 89 (29 Jan 2018 15:28) (81 - 92)  ABP: --  ABP(mean): --  RR: 17 (30 Jan 2018 05:50) (12 - 20)  SpO2: 99% (30 Jan 2018 05:50) (95% - 100%)    I&O's Detail    29 Jan 2018 07:01  -  30 Jan 2018 07:00  --------------------------------------------------------  IN:    Lactated Ringers IV Bolus: 800 mL    sodium chloride 0.9%.: 600 mL    Solution: 100 mL    Solution: 200 mL  Total IN: 1700 mL    OUT:  Total OUT: 0 mL    Total NET: 1700 mL            PHYSICAL EXAMINATION    Abdomen: soft, NT, ND; no ecchymoses at incisions    I&O's Summary    29 Jan 2018 07:01  -  30 Jan 2018 07:00  --------------------------------------------------------  IN: 1700 mL / OUT: 0 mL / NET: 1700 mL        LABS                        11.3   11.5  )-----------( 303      ( 30 Jan 2018 07:13 )             34.4             01-30    139  |  106  |  9   ----------------------------<  131<H>  3.7   |  25  |  0.55    Ca    8.8      30 Jan 2018 07:13  Phos  3.7     01-29  Mg     1.9     01-29    TPro  7.7  /  Alb  3.6  /  TBili  0.6  /  DBili  x   /  AST  33  /  ALT  76<H>  /  AlkPhos  121<H>  01-29    LIVER FUNCTIONS - ( 29 Jan 2018 05:09 )  Alb: 3.6 g/dL / Pro: 7.7 g/dL / ALK PHOS: 121 U/L / ALT: 76 U/L DA / AST: 33 U/L / GGT: x             MEDICATIONS:  MEDICATIONS  (STANDING):  ciprofloxacin   IVPB      ciprofloxacin   IVPB 400 milliGRAM(s) IV Intermittent every 12 hours  heparin  Injectable 5000 Unit(s) SubCutaneous every 8 hours  metroNIDAZOLE  IVPB 500 milliGRAM(s) IV Intermittent every 8 hours  metroNIDAZOLE  IVPB      sodium chloride 0.9%. 1000 milliLiter(s) (100 mL/Hr) IV Continuous <Continuous>    MEDICATIONS  (PRN):  acetaminophen   Tablet. 650 milliGRAM(s) Oral every 6 hours PRN Mild Pain (1 - 3)  HYDROmorphone   Tablet 1 milliGRAM(s) Oral every 4 hours PRN Severe Pain (7 - 10)  HYDROmorphone  Injectable 1 milliGRAM(s) IV Push every 4 hours PRN Severe Pain  ibuprofen  Tablet 400 milliGRAM(s) Oral every 4 hours PRN moderate pain  ondansetron Injectable 4 milliGRAM(s) IV Push every 6 hours PRN Nausea

## 2018-01-30 NOTE — DISCHARGE NOTE ADULT - PATIENT PORTAL LINK FT
“You can access the [employer] Patient Portal, offered by [facility], by registering with the following website: [email or web address]”

## 2018-01-30 NOTE — DISCHARGE NOTE ADULT - MEDICATION SUMMARY - MEDICATIONS TO STOP TAKING
I will STOP taking the medications listed below when I get home from the hospital:    Ceftin 250 mg oral tablet  -- 1 tab(s) by mouth 2 times a day for 7 days  -- Finish all this medication unless otherwise directed by prescriber.  Medication should be taken with plenty of water.  Take with food or milk.    naproxen 500 mg oral tablet  -- 1 tab(s) by mouth 2 times a day  -- Check with your doctor before becoming pregnant.  May cause drowsiness or dizziness.  Obtain medical advice before taking any non-prescription drugs as some may affect the action of this medication.  Take with food or milk.    Ceftin 500 mg oral tablet  -- 1 tab(s) by mouth 2 times a day for UTI  -- Finish all this medication unless otherwise directed by prescriber.  Medication should be taken with plenty of water.  Take with food or milk.

## 2018-02-03 LAB
CULTURE RESULTS: SIGNIFICANT CHANGE UP
CULTURE RESULTS: SIGNIFICANT CHANGE UP
SPECIMEN SOURCE: SIGNIFICANT CHANGE UP
SPECIMEN SOURCE: SIGNIFICANT CHANGE UP

## 2018-02-08 ENCOUNTER — APPOINTMENT (OUTPATIENT)
Dept: SURGERY | Facility: CLINIC | Age: 43
End: 2018-02-08
Payer: SELF-PAY

## 2018-02-08 VITALS
HEART RATE: 72 BPM | BODY MASS INDEX: 29.64 KG/M2 | DIASTOLIC BLOOD PRESSURE: 79 MMHG | TEMPERATURE: 97.9 F | HEIGHT: 61 IN | SYSTOLIC BLOOD PRESSURE: 118 MMHG | WEIGHT: 157 LBS

## 2018-02-08 DIAGNOSIS — Z78.9 OTHER SPECIFIED HEALTH STATUS: ICD-10-CM

## 2018-02-08 PROCEDURE — 99024 POSTOP FOLLOW-UP VISIT: CPT

## 2018-02-28 ENCOUNTER — APPOINTMENT (OUTPATIENT)
Dept: SURGERY | Facility: CLINIC | Age: 43
End: 2018-02-28
Payer: MEDICAID

## 2018-02-28 VITALS
DIASTOLIC BLOOD PRESSURE: 73 MMHG | BODY MASS INDEX: 30.96 KG/M2 | OXYGEN SATURATION: 100 % | TEMPERATURE: 98.4 F | SYSTOLIC BLOOD PRESSURE: 111 MMHG | HEIGHT: 61 IN | HEART RATE: 113 BPM | WEIGHT: 164 LBS

## 2018-02-28 DIAGNOSIS — K35.3 ACUTE APPENDICITIS WITH LOCALIZED PERITONITIS: ICD-10-CM

## 2018-02-28 PROCEDURE — 99024 POSTOP FOLLOW-UP VISIT: CPT

## 2018-05-23 PROCEDURE — 86900 BLOOD TYPING SEROLOGIC ABO: CPT

## 2018-05-23 PROCEDURE — 84100 ASSAY OF PHOSPHORUS: CPT

## 2018-05-23 PROCEDURE — 76830 TRANSVAGINAL US NON-OB: CPT

## 2018-05-23 PROCEDURE — 84443 ASSAY THYROID STIM HORMONE: CPT

## 2018-05-23 PROCEDURE — 88304 TISSUE EXAM BY PATHOLOGIST: CPT

## 2018-05-23 PROCEDURE — 86803 HEPATITIS C AB TEST: CPT

## 2018-05-23 PROCEDURE — 84702 CHORIONIC GONADOTROPIN TEST: CPT

## 2018-05-23 PROCEDURE — 80061 LIPID PANEL: CPT

## 2018-05-23 PROCEDURE — 76856 US EXAM PELVIC COMPLETE: CPT

## 2018-05-23 PROCEDURE — 83735 ASSAY OF MAGNESIUM: CPT

## 2018-05-23 PROCEDURE — 74176 CT ABD & PELVIS W/O CONTRAST: CPT

## 2018-05-23 PROCEDURE — 81001 URINALYSIS AUTO W/SCOPE: CPT

## 2018-05-23 PROCEDURE — 85730 THROMBOPLASTIN TIME PARTIAL: CPT

## 2018-05-23 PROCEDURE — 86901 BLOOD TYPING SEROLOGIC RH(D): CPT

## 2018-05-23 PROCEDURE — 83036 HEMOGLOBIN GLYCOSYLATED A1C: CPT

## 2018-05-23 PROCEDURE — 87040 BLOOD CULTURE FOR BACTERIA: CPT

## 2018-05-23 PROCEDURE — 83690 ASSAY OF LIPASE: CPT

## 2018-05-23 PROCEDURE — 80048 BASIC METABOLIC PNL TOTAL CA: CPT

## 2018-05-23 PROCEDURE — 80053 COMPREHEN METABOLIC PANEL: CPT

## 2018-05-23 PROCEDURE — 80307 DRUG TEST PRSMV CHEM ANLYZR: CPT

## 2018-05-23 PROCEDURE — 99285 EMERGENCY DEPT VISIT HI MDM: CPT | Mod: 25

## 2018-05-23 PROCEDURE — 74177 CT ABD & PELVIS W/CONTRAST: CPT

## 2018-05-23 PROCEDURE — 93306 TTE W/DOPPLER COMPLETE: CPT

## 2018-05-23 PROCEDURE — 93005 ELECTROCARDIOGRAM TRACING: CPT

## 2018-05-23 PROCEDURE — 36415 COLL VENOUS BLD VENIPUNCTURE: CPT

## 2018-05-23 PROCEDURE — 85027 COMPLETE CBC AUTOMATED: CPT

## 2018-05-23 PROCEDURE — 81025 URINE PREGNANCY TEST: CPT

## 2018-05-23 PROCEDURE — 87389 HIV-1 AG W/HIV-1&-2 AB AG IA: CPT

## 2018-05-23 PROCEDURE — 85610 PROTHROMBIN TIME: CPT

## 2018-05-23 PROCEDURE — 86850 RBC ANTIBODY SCREEN: CPT

## 2018-06-20 ENCOUNTER — LABORATORY RESULT (OUTPATIENT)
Age: 43
End: 2018-06-20

## 2018-06-20 ENCOUNTER — APPOINTMENT (OUTPATIENT)
Dept: OBGYN | Facility: CLINIC | Age: 43
End: 2018-06-20
Payer: MEDICAID

## 2018-06-20 VITALS
WEIGHT: 168 LBS | SYSTOLIC BLOOD PRESSURE: 110 MMHG | BODY MASS INDEX: 31.72 KG/M2 | DIASTOLIC BLOOD PRESSURE: 80 MMHG | HEIGHT: 61 IN

## 2018-06-20 DIAGNOSIS — Z83.3 FAMILY HISTORY OF DIABETES MELLITUS: ICD-10-CM

## 2018-06-20 DIAGNOSIS — Z91.14 PATIENT'S OTHER NONCOMPLIANCE WITH MEDICATION REGIMEN: ICD-10-CM

## 2018-06-20 PROCEDURE — 99396 PREV VISIT EST AGE 40-64: CPT

## 2018-07-05 ENCOUNTER — RESULT REVIEW (OUTPATIENT)
Age: 43
End: 2018-07-05

## 2018-07-05 ENCOUNTER — OUTPATIENT (OUTPATIENT)
Dept: OUTPATIENT SERVICES | Facility: HOSPITAL | Age: 43
LOS: 1 days | End: 2018-07-05
Payer: MEDICAID

## 2018-07-05 DIAGNOSIS — K92.2 GASTROINTESTINAL HEMORRHAGE, UNSPECIFIED: ICD-10-CM

## 2018-07-05 LAB — HCG UR QL: NEGATIVE — SIGNIFICANT CHANGE UP

## 2018-07-05 PROCEDURE — 88305 TISSUE EXAM BY PATHOLOGIST: CPT

## 2018-07-05 PROCEDURE — 43239 EGD BIOPSY SINGLE/MULTIPLE: CPT

## 2018-07-05 PROCEDURE — 81025 URINE PREGNANCY TEST: CPT

## 2018-07-05 PROCEDURE — 88312 SPECIAL STAINS GROUP 1: CPT | Mod: 26

## 2018-07-05 PROCEDURE — 88305 TISSUE EXAM BY PATHOLOGIST: CPT | Mod: 26

## 2018-07-05 PROCEDURE — 88312 SPECIAL STAINS GROUP 1: CPT

## 2018-07-09 LAB — SURGICAL PATHOLOGY FINAL REPORT - CH: SIGNIFICANT CHANGE UP

## 2018-07-12 ENCOUNTER — RESULT REVIEW (OUTPATIENT)
Age: 43
End: 2018-07-12

## 2018-07-12 ENCOUNTER — OUTPATIENT (OUTPATIENT)
Dept: OUTPATIENT SERVICES | Facility: HOSPITAL | Age: 43
LOS: 1 days | End: 2018-07-12
Payer: MEDICAID

## 2018-07-12 DIAGNOSIS — K62.5 HEMORRHAGE OF ANUS AND RECTUM: ICD-10-CM

## 2018-07-12 PROCEDURE — 45380 COLONOSCOPY AND BIOPSY: CPT

## 2018-07-12 PROCEDURE — 88305 TISSUE EXAM BY PATHOLOGIST: CPT

## 2018-07-12 PROCEDURE — 88305 TISSUE EXAM BY PATHOLOGIST: CPT | Mod: 26

## 2018-07-16 LAB — SURGICAL PATHOLOGY FINAL REPORT - CH: SIGNIFICANT CHANGE UP

## 2018-09-27 ENCOUNTER — RESULT REVIEW (OUTPATIENT)
Age: 43
End: 2018-09-27

## 2018-09-27 ENCOUNTER — OUTPATIENT (OUTPATIENT)
Dept: OUTPATIENT SERVICES | Facility: HOSPITAL | Age: 43
LOS: 1 days | End: 2018-09-27
Payer: MEDICAID

## 2018-09-27 DIAGNOSIS — K62.5 HEMORRHAGE OF ANUS AND RECTUM: ICD-10-CM

## 2018-09-27 LAB — HCG UR QL: NEGATIVE — SIGNIFICANT CHANGE UP

## 2018-09-27 PROCEDURE — 88305 TISSUE EXAM BY PATHOLOGIST: CPT

## 2018-09-27 PROCEDURE — 88305 TISSUE EXAM BY PATHOLOGIST: CPT | Mod: 26

## 2018-09-27 PROCEDURE — 81025 URINE PREGNANCY TEST: CPT

## 2018-09-27 PROCEDURE — 45380 COLONOSCOPY AND BIOPSY: CPT

## 2018-10-01 LAB — SURGICAL PATHOLOGY STUDY: SIGNIFICANT CHANGE UP

## 2020-08-20 NOTE — ED ADULT NURSE NOTE - NS ED NURSE DISCH DISPOSITION
Pt here for fu     Pt states he has been having chest pain the last couple weeks, radiation to his shoulder    No med list today Admitted

## 2021-02-08 ENCOUNTER — EMERGENCY (EMERGENCY)
Facility: HOSPITAL | Age: 46
LOS: 1 days | Discharge: HOME | End: 2021-02-08
Attending: EMERGENCY MEDICINE
Payer: COMMERCIAL

## 2021-02-08 VITALS
OXYGEN SATURATION: 99 % | HEIGHT: 62 IN | WEIGHT: 160.06 LBS | HEART RATE: 99 BPM | RESPIRATION RATE: 18 BRPM | DIASTOLIC BLOOD PRESSURE: 80 MMHG | SYSTOLIC BLOOD PRESSURE: 139 MMHG | TEMPERATURE: 98 F

## 2021-02-08 PROCEDURE — 73610 X-RAY EXAM OF ANKLE: CPT

## 2021-02-08 PROCEDURE — 99284 EMERGENCY DEPT VISIT MOD MDM: CPT | Mod: 25

## 2021-02-08 PROCEDURE — 99284 EMERGENCY DEPT VISIT MOD MDM: CPT

## 2021-02-08 PROCEDURE — 73610 X-RAY EXAM OF ANKLE: CPT | Mod: 26,RT

## 2021-02-08 PROCEDURE — 73030 X-RAY EXAM OF SHOULDER: CPT | Mod: 26,LT

## 2021-02-08 PROCEDURE — 73030 X-RAY EXAM OF SHOULDER: CPT

## 2021-02-08 RX ORDER — IBUPROFEN 200 MG
600 TABLET ORAL ONCE
Refills: 0 | Status: COMPLETED | OUTPATIENT
Start: 2021-02-08 | End: 2021-02-08

## 2021-02-08 RX ADMIN — Medication 600 MILLIGRAM(S): at 12:37

## 2021-02-08 NOTE — ED PROVIDER NOTE - ATTENDING CONTRIBUTION TO CARE
46 yo F with left shoulder, back and right ankle pain s/p fall. Patient neurovascularly intact. FROM of all extremities. XR negative for acute fracture. Placed in air cast. Ambulatory in the ED without difficulty. Nontoxic and medically stable for discharge. Return precautions provided and patient understands to return to the ED for worsening signs and symptoms. Instructed to follow up with primary care physician and agreeable. Patient's questions answered and given copies of lab results.    Dr. REYNA Navarrete:  I have independently evaluated the patient and have documented in the appropriate sections above.  I agree with the exam and plan as noted above.

## 2021-02-08 NOTE — ED PROVIDER NOTE - PROGRESS NOTE DETAILS
Patient nontoxic and medically stable for discharge. XR negative. Results discussed with patient. Return precautions provided and patient understands to return to the ED for concerning or worsening signs and symptoms. Instructed to follow up with ortho if not better and agreeable. Patient's questions answered.   Air Cast placed.

## 2021-02-08 NOTE — ED PROVIDER NOTE - PHYSICAL EXAMINATION
Left shoulder: non focal, superior and anterior tenderness to palpation. Radial pulses intact. No sensory loss.     Right ankle: tenderness to palpation of bilateral malleolus. Worse to the lateral malleolus. DPPT pulses intact. No sensory loss.

## 2021-02-08 NOTE — ED ADULT NURSE NOTE - OBJECTIVE STATEMENT
States she slipped and fell in the bathroom of her house last Monday ,c/o pain to right ankle , left shoulder .Denies LOC.

## 2021-02-08 NOTE — ED PROVIDER NOTE - CROS ED NEURO NEG
HISTORY OF PRESENT ILLNESS  Shane Gayle is a 34 y.o. female. HPI  Patient of Dr. Leonardo Jessica, generally healthy. Comes in with a week of ear congestion, sore throat, cough, mostly nonproductive. Believes she had fever last week, none now. Has two small children at home, want to be sure she is not contagious. Rapid strep is negative. Review of Systems   Constitutional: Positive for malaise/fatigue. Negative for chills, diaphoresis and fever. HENT: Positive for congestion. Negative for ear discharge, ear pain, nosebleeds and sore throat. Eyes: Negative for pain and discharge. Respiratory: Positive for cough. Negative for hemoptysis, sputum production, shortness of breath and wheezing. Cardiovascular: Negative for chest pain. Neurological: Negative for headaches. Physical Exam   Constitutional: She appears well-developed and well-nourished. HENT:   Head: Normocephalic. Right Ear: Tympanic membrane, external ear and ear canal normal.   Left Ear: Tympanic membrane, external ear and ear canal normal.   Nose: Nose normal.   Mouth/Throat: Oropharynx is clear and moist and mucous membranes are normal. No oropharyngeal exudate. Eyes: Pupils are equal, round, and reactive to light. Conjunctivae are normal. Right eye exhibits no discharge. Left eye exhibits no discharge. Neck: Normal range of motion. Neck supple. Cardiovascular: Normal rate, regular rhythm and normal heart sounds. Pulmonary/Chest: Effort normal and breath sounds normal. No respiratory distress. She has no wheezes. She has no rales. Lymphadenopathy:     She has no cervical adenopathy. Nursing note and vitals reviewed. ASSESSMENT and PLAN  Diagnoses and all orders for this visit:    1. URI, acute  -     azithromycin (ZITHROMAX) 250 mg tablet; Per pack    2.  Sore throat  -     AMB POC RAPID STREP A is neg  Reviewed comfort measures for symptoms including tylenol every four hours prn, fluids and rest.  Follow up if signs and symptoms worsen or change. After hours number given. no LOC, no change in vision

## 2021-02-08 NOTE — ED PROVIDER NOTE - CARE PLAN
Principal Discharge DX:	Musculoskeletal pain  Secondary Diagnosis:	Sprain of right ankle, unspecified ligament, initial encounter

## 2021-02-08 NOTE — ED PROVIDER NOTE - CLINICAL SUMMARY MEDICAL DECISION MAKING FREE TEXT BOX
45 year old female presenting after mechanical fall a week ago with persistent left shoulder, left lateral back, and right ankle pain. Will x-ray the left shoulder and right ankle. No indication for further imaging. Will give Ibuprofen for the pain and reassess.

## 2021-02-08 NOTE — ED PROVIDER NOTE - OBJECTIVE STATEMENT
45 year old female with no PMHx, PSHx of appendectomy presenting to the ED s/p fall 1 week ago after losing balance. Patient now complaining of pain to the left shoulder, left mid back, and right ankle. Patient also states that she hit her head at the time of injury reports no vomiting, no nausea, no change in vision, no LOC, no difficulty ambulating due to dizziness, or any other red flags for head injury. Patient now with continued pain to her left shoulder and is unable to move arm up posteriorly secondary to pain in the left shoulder. She states that the main source of her pain is the right ankle which sustained an inversion injury. No reported chest pain, no shortness of breath, or any other injuries. Denies taking pain medication today.

## 2021-02-08 NOTE — ED ADULT NURSE NOTE - NSIMPLEMENTINTERV_GEN_ALL_ED
Implemented All Universal Safety Interventions:  [place] to call system. Call bell, personal items and telephone within reach. Instruct patient to call for assistance. Room bathroom lighting operational. Non-slip footwear when patient is off stretcher. Physically safe environment: no spills, clutter or unnecessary equipment. Stretcher in lowest position, wheels locked, appropriate side rails in place.

## 2022-11-15 ENCOUNTER — INPATIENT (INPATIENT)
Facility: HOSPITAL | Age: 47
LOS: 1 days | Discharge: HOME | DRG: 149 | End: 2022-11-17
Attending: INTERNAL MEDICINE | Admitting: INTERNAL MEDICINE
Payer: MEDICAID

## 2022-11-15 VITALS
TEMPERATURE: 98 F | HEIGHT: 62 IN | SYSTOLIC BLOOD PRESSURE: 125 MMHG | RESPIRATION RATE: 19 BRPM | HEART RATE: 75 BPM | DIASTOLIC BLOOD PRESSURE: 83 MMHG | OXYGEN SATURATION: 100 % | WEIGHT: 181 LBS

## 2022-11-15 DIAGNOSIS — R07.9 CHEST PAIN, UNSPECIFIED: ICD-10-CM

## 2022-11-15 DIAGNOSIS — R42 DIZZINESS AND GIDDINESS: ICD-10-CM

## 2022-11-15 DIAGNOSIS — Z29.9 ENCOUNTER FOR PROPHYLACTIC MEASURES, UNSPECIFIED: ICD-10-CM

## 2022-11-15 LAB
ALBUMIN SERPL ELPH-MCNC: 3.4 G/DL — LOW (ref 3.5–5)
ALP SERPL-CCNC: 161 U/L — HIGH (ref 40–120)
ALT FLD-CCNC: 130 U/L DA — HIGH (ref 10–60)
ANION GAP SERPL CALC-SCNC: 4 MMOL/L — LOW (ref 5–17)
APTT BLD: 33.2 SEC — SIGNIFICANT CHANGE UP (ref 27.5–35.5)
AST SERPL-CCNC: 55 U/L — HIGH (ref 10–40)
BASOPHILS # BLD AUTO: 0.05 K/UL — SIGNIFICANT CHANGE UP (ref 0–0.2)
BASOPHILS NFR BLD AUTO: 0.6 % — SIGNIFICANT CHANGE UP (ref 0–2)
BILIRUB SERPL-MCNC: 0.3 MG/DL — SIGNIFICANT CHANGE UP (ref 0.2–1.2)
BUN SERPL-MCNC: 10 MG/DL — SIGNIFICANT CHANGE UP (ref 7–18)
CALCIUM SERPL-MCNC: 9.1 MG/DL — SIGNIFICANT CHANGE UP (ref 8.4–10.5)
CHLORIDE SERPL-SCNC: 105 MMOL/L — SIGNIFICANT CHANGE UP (ref 96–108)
CK SERPL-CCNC: 96 U/L — SIGNIFICANT CHANGE UP (ref 21–215)
CO2 SERPL-SCNC: 30 MMOL/L — SIGNIFICANT CHANGE UP (ref 22–31)
CREAT SERPL-MCNC: 0.75 MG/DL — SIGNIFICANT CHANGE UP (ref 0.5–1.3)
D DIMER BLD IA.RAPID-MCNC: 164 NG/ML DDU — SIGNIFICANT CHANGE UP
EGFR: 99 ML/MIN/1.73M2 — SIGNIFICANT CHANGE UP
EOSINOPHIL # BLD AUTO: 0.44 K/UL — SIGNIFICANT CHANGE UP (ref 0–0.5)
EOSINOPHIL NFR BLD AUTO: 5.7 % — SIGNIFICANT CHANGE UP (ref 0–6)
GLUCOSE SERPL-MCNC: 148 MG/DL — HIGH (ref 70–99)
HCG SERPL-ACNC: <1 MIU/ML — SIGNIFICANT CHANGE UP
HCT VFR BLD CALC: 41.2 % — SIGNIFICANT CHANGE UP (ref 34.5–45)
HGB BLD-MCNC: 12.8 G/DL — SIGNIFICANT CHANGE UP (ref 11.5–15.5)
IMM GRANULOCYTES NFR BLD AUTO: 0.3 % — SIGNIFICANT CHANGE UP (ref 0–0.9)
INR BLD: 1.09 RATIO — SIGNIFICANT CHANGE UP (ref 0.88–1.16)
LYMPHOCYTES # BLD AUTO: 2.41 K/UL — SIGNIFICANT CHANGE UP (ref 1–3.3)
LYMPHOCYTES # BLD AUTO: 31.3 % — SIGNIFICANT CHANGE UP (ref 13–44)
MCHC RBC-ENTMCNC: 28.8 PG — SIGNIFICANT CHANGE UP (ref 27–34)
MCHC RBC-ENTMCNC: 31.1 GM/DL — LOW (ref 32–36)
MCV RBC AUTO: 92.6 FL — SIGNIFICANT CHANGE UP (ref 80–100)
MONOCYTES # BLD AUTO: 0.52 K/UL — SIGNIFICANT CHANGE UP (ref 0–0.9)
MONOCYTES NFR BLD AUTO: 6.7 % — SIGNIFICANT CHANGE UP (ref 2–14)
NEUTROPHILS # BLD AUTO: 4.27 K/UL — SIGNIFICANT CHANGE UP (ref 1.8–7.4)
NEUTROPHILS NFR BLD AUTO: 55.4 % — SIGNIFICANT CHANGE UP (ref 43–77)
NRBC # BLD: 0 /100 WBCS — SIGNIFICANT CHANGE UP (ref 0–0)
NT-PROBNP SERPL-SCNC: 10 PG/ML — SIGNIFICANT CHANGE UP (ref 0–125)
PLATELET # BLD AUTO: 318 K/UL — SIGNIFICANT CHANGE UP (ref 150–400)
POTASSIUM SERPL-MCNC: 4.2 MMOL/L — SIGNIFICANT CHANGE UP (ref 3.5–5.3)
POTASSIUM SERPL-SCNC: 4.2 MMOL/L — SIGNIFICANT CHANGE UP (ref 3.5–5.3)
PROT SERPL-MCNC: 8.3 G/DL — SIGNIFICANT CHANGE UP (ref 6–8.3)
PROTHROM AB SERPL-ACNC: 13 SEC — SIGNIFICANT CHANGE UP (ref 10.5–13.4)
RBC # BLD: 4.45 M/UL — SIGNIFICANT CHANGE UP (ref 3.8–5.2)
RBC # FLD: 12.5 % — SIGNIFICANT CHANGE UP (ref 10.3–14.5)
SARS-COV-2 RNA SPEC QL NAA+PROBE: SIGNIFICANT CHANGE UP
SODIUM SERPL-SCNC: 139 MMOL/L — SIGNIFICANT CHANGE UP (ref 135–145)
TROPONIN I, HIGH SENSITIVITY RESULT: 4.3 NG/L — SIGNIFICANT CHANGE UP
TROPONIN I, HIGH SENSITIVITY RESULT: <3 NG/L — SIGNIFICANT CHANGE UP
TSH SERPL-MCNC: 2.77 UU/ML — SIGNIFICANT CHANGE UP (ref 0.34–4.82)
WBC # BLD: 7.71 K/UL — SIGNIFICANT CHANGE UP (ref 3.8–10.5)
WBC # FLD AUTO: 7.71 K/UL — SIGNIFICANT CHANGE UP (ref 3.8–10.5)

## 2022-11-15 PROCEDURE — 70450 CT HEAD/BRAIN W/O DYE: CPT | Mod: 26,MA

## 2022-11-15 PROCEDURE — 71046 X-RAY EXAM CHEST 2 VIEWS: CPT | Mod: 26

## 2022-11-15 PROCEDURE — 99285 EMERGENCY DEPT VISIT HI MDM: CPT

## 2022-11-15 RX ORDER — MECLIZINE HCL 12.5 MG
25 TABLET ORAL ONCE
Refills: 0 | Status: COMPLETED | OUTPATIENT
Start: 2022-11-15 | End: 2022-11-15

## 2022-11-15 RX ORDER — SODIUM CHLORIDE 9 MG/ML
1000 INJECTION INTRAMUSCULAR; INTRAVENOUS; SUBCUTANEOUS ONCE
Refills: 0 | Status: COMPLETED | OUTPATIENT
Start: 2022-11-15 | End: 2022-11-15

## 2022-11-15 RX ORDER — MECLIZINE HCL 12.5 MG
25 TABLET ORAL EVERY 8 HOURS
Refills: 0 | Status: DISCONTINUED | OUTPATIENT
Start: 2022-11-15 | End: 2022-11-17

## 2022-11-15 RX ORDER — KETOROLAC TROMETHAMINE 30 MG/ML
15 SYRINGE (ML) INJECTION ONCE
Refills: 0 | Status: DISCONTINUED | OUTPATIENT
Start: 2022-11-15 | End: 2022-11-15

## 2022-11-15 RX ORDER — DIAZEPAM 5 MG
5 TABLET ORAL ONCE
Refills: 0 | Status: DISCONTINUED | OUTPATIENT
Start: 2022-11-15 | End: 2022-11-15

## 2022-11-15 RX ADMIN — SODIUM CHLORIDE 1000 MILLILITER(S): 9 INJECTION INTRAMUSCULAR; INTRAVENOUS; SUBCUTANEOUS at 15:32

## 2022-11-15 RX ADMIN — Medication 15 MILLIGRAM(S): at 10:07

## 2022-11-15 RX ADMIN — Medication 25 MILLIGRAM(S): at 10:07

## 2022-11-15 RX ADMIN — Medication 15 MILLIGRAM(S): at 15:27

## 2022-11-15 NOTE — ED PROVIDER NOTE - NEUROLOGICAL, MLM
Alert and oriented, no focal deficits, no motor or sensory deficits. Negative Romberg, Negative finger to nose, Negative pronator drift

## 2022-11-15 NOTE — ED ADULT NURSE NOTE - NSIMPLEMENTINTERV_GEN_ALL_ED
Implemented All Universal Safety Interventions:  [place] to call system. Call bell, personal items and telephone within reach. Instruct patient to call for assistance. Room bathroom lighting operational. Non-slip footwear when patient is off stretcher. Physically safe environment: no spills, clutter or unnecessary equipment. Stretcher in lowest position, wheels locked, appropriate side rails in place.

## 2022-11-15 NOTE — ED ADULT NURSE NOTE - OBJECTIVE STATEMENT
Patient arrived ambulatory with c/o chest pain onset 4 days, dizziness on/off, heaviness to left arm

## 2022-11-15 NOTE — H&P ADULT - NSHPREVIEWOFSYSTEMS_GEN_ALL_CORE
CONSTITUTIONAL: No fever, weight loss, or fatigue  RESPIRATORY: No cough, wheezing, chills or hemoptysis; No shortness of breath  CARDIOVASCULAR: No palpitations, or leg swelling  GASTROINTESTINAL: No abdominal pain. No nausea, vomiting, or hematemesis; No diarrhea or constipation. No melena or hematochezia.  GENITOURINARY: No dysuria or hematuria, urinary frequency  NEUROLOGICAL: No headaches, memory loss, loss of strength, numbness, or tremors  ENDOCRINE: No polyuria, polydipsia, or heat/cold intolerance  MUSKULOSKELETAL: No muscle aches, joint pains  HEME: no easy bruisability, no tender or enlarged lymph nodes  SKIN: No itching, burning, rashes, or lesions

## 2022-11-15 NOTE — ED PROVIDER NOTE - CLINICAL SUMMARY MEDICAL DECISION MAKING FREE TEXT BOX
47 y.o F with PMH of Covid x 3 since 2020, who presented to the ED with complains of Chest pain, Vertigo and generalized weakness. Pt states that she started to have chest pain since 4 days.     Will obtain EKG, troponin, CBC/CMP, TSH, cardiac monitoring.  Will give meclizine and toradol. Will reassess.

## 2022-11-15 NOTE — H&P ADULT - NSHPPHYSICALEXAM_GEN_ALL_CORE
GENERAL: NAD, obese  HEAD:  Atraumatic, Normocephalic  EYES: EOMI, PERRLA, conjunctiva and sclera clear  NECK: Supple, No JVD  CHEST/LUNG: Clear to auscultation bilaterally; No wheeze  HEART: Regular rate and rhythm; No murmurs, rubs, or gallops  ABDOMEN: Soft, Nontender, Nondistended; Bowel sounds present  EXTREMITIES:  2+ Peripheral Pulses, No clubbing, cyanosis, or edema  PSYCH: AAOx3  NEUROLOGY: non-focal, Ivy Hallpike negative  SKIN: No rashes or lesions

## 2022-11-15 NOTE — ED PROVIDER NOTE - ATTENDING CONTRIBUTION TO CARE
Patient with intractable vertigo, left sided chest pain. on exam, patient unable to ambulate, symptoms exacerbated when turning head to right. heart regular lungs clear. intact finger to nose, awake alert

## 2022-11-15 NOTE — ED PROVIDER NOTE - CARE PLAN
1 Principal Discharge DX:	Vertigo  Secondary Diagnosis:	Unable to ambulate  Secondary Diagnosis:	ACS (acute coronary syndrome)

## 2022-11-15 NOTE — ED PROVIDER NOTE - CHPI ED SYMPTOMS NEG
no back pain/no cough/no fever/no shortness of breath/no syncope/no vomiting/no chills/no diaphoresis

## 2022-11-15 NOTE — ED ADULT NURSE NOTE - NS ED NURSE LEVEL OF CONSCIOUSNESS MENTAL STATUS
Patient is calling because he needs an order for prehab physical therapy prior to surgery. Patient stated he called to schedule and was told that  had not put in an order for it yet.    Patient also stated that [facility] in [place] faxed over his dental clearance and he would like to know if  office had received it yet. Please call back Dion   Alert/Cooperative

## 2022-11-15 NOTE — H&P ADULT - PROBLEM SELECTOR PLAN 1
Pt presented with dizziness associated with CP w/dyspnea  Chest pain resolved after ketoralac in ED  Given 25mg Meclizine in ED - dizziness improved, still present  EKG NSR  Trop negative x2    C/w Meclizine PRN    Cardio Dr Barnard Consulted  Neuro Dr. Bass Consulted Pt presented with dizziness associated with CP w/dyspnea  Chest pain resolved after ketoralac in ED  Given 25mg Meclizine in ED - dizziness improved, still present  Dizziness likely due to BPPV vs less likely cardiac etiology  EKG NSR  Trop negative x2    C/w Meclizine PRN    Cardio Dr Barnard Consulted  Neuro Dr. Bass Consulted Pt presented with dizziness associated with CP w/dyspnea  Chest pain resolved after ketoralac in ED  Given 25mg Meclizine in ED   Dizziness likely due to BPPV (sxs improved with meclizine) vs less likely cardiac etiology  EKG NSR  Trop negative x2    C/w Meclizine PRN    Cardio Dr Barnard Consulted  Neuro Dr. Bass Consulted

## 2022-11-15 NOTE — H&P ADULT - ASSESSMENT
This is a 47yoF with no pmhx who presents with chest pain, palpitations and dizziness. Admitted for intractable dizziness.

## 2022-11-15 NOTE — H&P ADULT - HISTORY OF PRESENT ILLNESS
This is a 47yoF with no pmhx who presents with chest pain, palpitations and dizziness. Pt states chest pain began 4 days ago was left sided, 9/10 in intensity. Pain was sharp, intermittent, without aggravating or relieving causes and radiated to left shoulder. Patient has had nausea for the past 2 days, but denies any currently. Dizziness began a week ago, and feels like the room is spinning. Pt had palpitations with CP however it has resolved since. Pt denies current headaches, SOB, fever, chills, N/V/D, dysuria, constipation, numbness or tingling.     ED vitals: T 98.1, HR 75, /83, 100%RA  ED meds: 1LNS, meclizine 25mg, ketorolac 15mg This is a 47yoF with no pmhx who presents with chest pain, palpitations and dizziness. Pt states chest pain began 4 days ago was left sided, 9/10 in intensity. Pain was sharp, intermittent, without aggravating or relieving causes and radiated to left shoulder. Patient has had nausea for the past 2 days, but denies any currently. Dizziness began a week ago, feels like the room is spinning, and has no relieving or aggravating factors. Pt had palpitations with CP however it has resolved since. Pt denies current headaches, SOB, fever, chills, N/V/D, dysuria, constipation, numbness or tingling.     ED vitals: T 98.1, HR 75, /83, 100%RA  ED meds: 1LNS, meclizine 25mg, ketorolac 15mg

## 2022-11-15 NOTE — ED PROVIDER NOTE - OBJECTIVE STATEMENT
47 y.o F with PMH of Covid x 3 since 2020, who presented to the ED with complains of Chest pain, Vertigo and generalized weakness. Pt states that she started to have chest pain since 4 days. It is intermittent pain, localized to the left chest wall, 9/10, sometimes sharp, sometimes pressure-like, not aggravated by anything, worse with deep breathing, improves with slow shallow breaths. She also endorses associated nausea sometimes with this episodes, and feels a burning sensation on palpation. She also endorses vertigo for 1 week, described as room spinning sensation, worse when she changes positions suddenly, denies any hearing loss, or ear pain/sxs. She also endorses generalized weakness and fatigue since having covid, always feeling tired and sleepy. Patient denies any SOB, cough, abd pain, vomiting, diarrhea, constipation, numbness, tingling, headache, urinary sxs.

## 2022-11-16 DIAGNOSIS — R74.01 ELEVATION OF LEVELS OF LIVER TRANSAMINASE LEVELS: ICD-10-CM

## 2022-11-16 DIAGNOSIS — R42 DIZZINESS AND GIDDINESS: ICD-10-CM

## 2022-11-16 DIAGNOSIS — Z02.9 ENCOUNTER FOR ADMINISTRATIVE EXAMINATIONS, UNSPECIFIED: ICD-10-CM

## 2022-11-16 LAB
ALBUMIN SERPL ELPH-MCNC: 3.2 G/DL — LOW (ref 3.5–5)
ALP SERPL-CCNC: 138 U/L — HIGH (ref 40–120)
ALT FLD-CCNC: 123 U/L DA — HIGH (ref 10–60)
ANION GAP SERPL CALC-SCNC: 6 MMOL/L — SIGNIFICANT CHANGE UP (ref 5–17)
AST SERPL-CCNC: 61 U/L — HIGH (ref 10–40)
BASOPHILS # BLD AUTO: 0.04 K/UL — SIGNIFICANT CHANGE UP (ref 0–0.2)
BASOPHILS NFR BLD AUTO: 0.6 % — SIGNIFICANT CHANGE UP (ref 0–2)
BILIRUB SERPL-MCNC: 0.3 MG/DL — SIGNIFICANT CHANGE UP (ref 0.2–1.2)
BUN SERPL-MCNC: 19 MG/DL — HIGH (ref 7–18)
CALCIUM SERPL-MCNC: 8.9 MG/DL — SIGNIFICANT CHANGE UP (ref 8.4–10.5)
CHLORIDE SERPL-SCNC: 108 MMOL/L — SIGNIFICANT CHANGE UP (ref 96–108)
CO2 SERPL-SCNC: 26 MMOL/L — SIGNIFICANT CHANGE UP (ref 22–31)
CREAT SERPL-MCNC: 0.77 MG/DL — SIGNIFICANT CHANGE UP (ref 0.5–1.3)
EGFR: 96 ML/MIN/1.73M2 — SIGNIFICANT CHANGE UP
EOSINOPHIL # BLD AUTO: 0.47 K/UL — SIGNIFICANT CHANGE UP (ref 0–0.5)
EOSINOPHIL NFR BLD AUTO: 6.7 % — HIGH (ref 0–6)
GLUCOSE SERPL-MCNC: 146 MG/DL — HIGH (ref 70–99)
HCT VFR BLD CALC: 40 % — SIGNIFICANT CHANGE UP (ref 34.5–45)
HGB BLD-MCNC: 12.6 G/DL — SIGNIFICANT CHANGE UP (ref 11.5–15.5)
IMM GRANULOCYTES NFR BLD AUTO: 0.1 % — SIGNIFICANT CHANGE UP (ref 0–0.9)
LYMPHOCYTES # BLD AUTO: 2.6 K/UL — SIGNIFICANT CHANGE UP (ref 1–3.3)
LYMPHOCYTES # BLD AUTO: 37.1 % — SIGNIFICANT CHANGE UP (ref 13–44)
MAGNESIUM SERPL-MCNC: 2 MG/DL — SIGNIFICANT CHANGE UP (ref 1.6–2.6)
MCHC RBC-ENTMCNC: 29.2 PG — SIGNIFICANT CHANGE UP (ref 27–34)
MCHC RBC-ENTMCNC: 31.5 GM/DL — LOW (ref 32–36)
MCV RBC AUTO: 92.6 FL — SIGNIFICANT CHANGE UP (ref 80–100)
MONOCYTES # BLD AUTO: 0.42 K/UL — SIGNIFICANT CHANGE UP (ref 0–0.9)
MONOCYTES NFR BLD AUTO: 6 % — SIGNIFICANT CHANGE UP (ref 2–14)
NEUTROPHILS # BLD AUTO: 3.46 K/UL — SIGNIFICANT CHANGE UP (ref 1.8–7.4)
NEUTROPHILS NFR BLD AUTO: 49.5 % — SIGNIFICANT CHANGE UP (ref 43–77)
NRBC # BLD: 0 /100 WBCS — SIGNIFICANT CHANGE UP (ref 0–0)
PHOSPHATE SERPL-MCNC: 4.1 MG/DL — SIGNIFICANT CHANGE UP (ref 2.5–4.5)
PLATELET # BLD AUTO: 308 K/UL — SIGNIFICANT CHANGE UP (ref 150–400)
POTASSIUM SERPL-MCNC: 4.5 MMOL/L — SIGNIFICANT CHANGE UP (ref 3.5–5.3)
POTASSIUM SERPL-SCNC: 4.5 MMOL/L — SIGNIFICANT CHANGE UP (ref 3.5–5.3)
PROT SERPL-MCNC: 7.6 G/DL — SIGNIFICANT CHANGE UP (ref 6–8.3)
RBC # BLD: 4.32 M/UL — SIGNIFICANT CHANGE UP (ref 3.8–5.2)
RBC # FLD: 12.6 % — SIGNIFICANT CHANGE UP (ref 10.3–14.5)
SODIUM SERPL-SCNC: 140 MMOL/L — SIGNIFICANT CHANGE UP (ref 135–145)
WBC # BLD: 7 K/UL — SIGNIFICANT CHANGE UP (ref 3.8–10.5)
WBC # FLD AUTO: 7 K/UL — SIGNIFICANT CHANGE UP (ref 3.8–10.5)

## 2022-11-16 PROCEDURE — 99223 1ST HOSP IP/OBS HIGH 75: CPT

## 2022-11-16 RX ORDER — KETOROLAC TROMETHAMINE 30 MG/ML
15 SYRINGE (ML) INJECTION EVERY 6 HOURS
Refills: 0 | Status: DISCONTINUED | OUTPATIENT
Start: 2022-11-16 | End: 2022-11-17

## 2022-11-16 RX ADMIN — Medication 25 MILLIGRAM(S): at 10:15

## 2022-11-16 RX ADMIN — Medication 15 MILLIGRAM(S): at 10:15

## 2022-11-16 RX ADMIN — Medication 15 MILLIGRAM(S): at 10:40

## 2022-11-16 NOTE — CONSULT NOTE ADULT - ASSESSMENT
Vertigo with balance problems falling to the right side concerning for posterior circulation stroke and will recommend to evaluate with MRI brain.  IF there is acute stroke, then will recommend to get MRA head, CD, TTE, LDL, start asa, plavix and statin for secondary stroke prevention.  Meclazine 25mg [id number] for vertigo, switch to prn when the vertigo improves.  PT dae.    koko NP

## 2022-11-16 NOTE — CONSULT NOTE ADULT - ASSESSMENT
46 yo Female with no pmhx who presents with chest pain, palpitations and dizziness.   1.Tele monitoring.  2.Echocardiogram.  3.Neurology eval.  4.Othostatic BP.  5.GI and DVT prophylaxis

## 2022-11-16 NOTE — CONSULT NOTE ADULT - SUBJECTIVE AND OBJECTIVE BOX
***TEMPLATE ONLY***      Patient is a 47y old  Female who presents with a chief complaint of     HPI:  This is a 47yoF with no pmhx who presents with chest pain, palpitations and dizziness. Pt states chest pain began 4 days ago was left sided, 9/10 in intensity. Pain was sharp, intermittent, without aggravating or relieving causes and radiated to left shoulder. Patient has had nausea for the past 2 days, but denies any currently. Dizziness began a week ago, feels like the room is spinning, and has no relieving or aggravating factors. Pt had palpitations with CP however it has resolved since. Pt denies current headaches, SOB, fever, chills, N/V/D, dysuria, constipation, numbness or tingling.     ED vitals: T 98.1, HR 75, /83, 100%RA  ED meds: 1LNS, meclizine 25mg, ketorolac 15mg (15 Nov 2022 19:29)         Neurological Review of Systems:  No difficulty with language.  No vision loss or double vision.  No  new hearing loss.  No difficulty with speech or swallowing.  No focal weakness.  No focal sensory changes.  No numbness or tingling in the bilateral lower extremities.  No difficulty with balance.  No difficulty with ambulation.        MEDICATIONS  (STANDING):    MEDICATIONS  (PRN):  ketorolac   Injectable 15 milliGRAM(s) IV Push every 6 hours PRN Severe Pain (7 - 10)  meclizine 25 milliGRAM(s) Oral every 8 hours PRN Dizziness    Allergies    No Known Allergies    Intolerances      PAST MEDICAL & SURGICAL HISTORY:  Kidney stone      2019 novel coronavirus disease (COVID-19)      No significant past surgical history        FAMILY HISTORY:  FH: heart disease (Mother)      SOCIAL HISTORY: non smoker/ former smoker/ active smoker    Review of Systems:  Constitutional: No generalized weakness. No fevers or chills.                    Eyes, Ears, Mouth, Throat: No vision loss   Respiratory: No shortness of breath or cough.                                Cardiovascular: No chest pain or palpitations  Gastrointestinal: No nausea or vomiting.                                         Genitourinary: No urinary incontinence or burning on urination.  Musculoskeletal: No joint pain.                                                           Dermatologic: No rash.  Neurological: as per HPI                                                                      Psychiatric: No behavioral problems.  Endocrine: No known hypoglycemia.               Hematologic/Lymphatic: No easy bleeding.    O:  Vital Signs Last 24 Hrs  T(C): 36.9 (16 Nov 2022 07:20), Max: 36.9 (16 Nov 2022 07:20)  T(F): 98.5 (16 Nov 2022 07:20), Max: 98.5 (16 Nov 2022 07:20)  HR: 71 (16 Nov 2022 07:20) (71 - 75)  BP: 103/65 (16 Nov 2022 07:20) (103/65 - 128/84)  BP(mean): --  RR: 17 (16 Nov 2022 07:20) (17 - 18)  SpO2: 98% (16 Nov 2022 07:20) (97% - 99%)    Parameters below as of 16 Nov 2022 07:20  Patient On (Oxygen Delivery Method): room air        General Exam:   General appearance: No acute distress                 Cardiovascular: Pedal dorsalis pulses intact bilaterally    Mental Status: Orientated to self, date and place.  Attention intact.  No dysarthria, aphasia or neglect.  Knowledge intact.  Registration intact.  Short and long term memory grossly intact.      Cranial Nerves: CN I - not tested.  PERRL, EOMI, VFF, no nystagmus or diplopia.  No APD.  Fundi not visualized.  CN V1-3 intact to light touch and pinprick.  No facial asymmetry.  Hearing intact to finger rub bilaterally.  Tongue, uvula and palate midline.  Sternocleidomastoid and Trapezius intact bilaterally.    Motor:   Tone: normal.                  Strength intact throughout  No pronator drift bilaterally                      No dysmetria on finger-nose-finger or heel-shin-heel  No truncal ataxia.  No resting, postural or action tremor.  No myoclonus.    Sensation: intact to light touch, pinprick, vibration and proprioception    Deep Tendon Reflexes: 1+ bilateral biceps, triceps, brachioradialis, knee and ankle  Toes flexor bilaterally    Gait: normal and stable.  Rhomberg -shreyas.    Other:     LABS:                        12.6   7.00  )-----------( 308      ( 16 Nov 2022 05:40 )             40.0     11-16    140  |  108  |  19<H>  ----------------------------<  146<H>  4.5   |  26  |  0.77    Ca    8.9      16 Nov 2022 05:40  Phos  4.1     11-16  Mg     2.0     11-16    TPro  7.6  /  Alb  3.2<L>  /  TBili  0.3  /  DBili  x   /  AST  61<H>  /  ALT  123<H>  /  AlkPhos  138<H>  11-16    PT/INR - ( 15 Nov 2022 09:40 )   PT: 13.0 sec;   INR: 1.09 ratio         PTT - ( 15 Nov 2022 09:40 )  PTT:33.2 sec        RADIOLOGY & ADDITIONAL STUDIES:    EKG: < from: 12 Lead ECG (11.15.22 @ 08:08) >    Ventricular Rate 74 BPM    Atrial Rate 74 BPM    P-R Interval 126 ms    QRS Duration 78 ms    Q-T Interval 394 ms    QTC Calculation(Bazett) 437 ms    P Axis -14 degrees    R Axis 10 degrees    T Axis 33 degrees    Diagnosis Line Normal sinus rhythm  Normal ECG    Confirmed by MYLES ALVAREZ, [facility] ([id number]) on 11/16/2022 8:39:43 AM    < end of copied text >  < from: CT Head No Cont (11.15.22 @ 16:01) >  (images reviewed)    ACC: [id number] EXAM:  CT BRAIN                          PROCEDURE DATE:  11/15/2022          INTERPRETATION:  Clinical indications: Dizziness    Technique:  Multiple axial sections were acquired from the base of the skull to the   vertex without contrast enhancement. Coronal and sagittal reconstructed   images were performed as well.    Findings:  The lateral ventricles have a normal configuration.    There is no evidence of acute hemorrhage, mass or mass-effect in the   posterior fossa or in the supratentorial region.    Evaluation of the osseous structures with the appropriate window appears   unremarkable.    The visualized paranasal sinuses mastoid and middle ear regions appear   clear.    Impression:  Unremarkable noncontrast head CT.    --- End of Report ---            SHARRI MELISSA MD; Attending Radiologist  This document has been electronically signed. Nov 15 2022  3:59PM    < end of copied text >       Patient is a 47y old  Female who presents with a chief complaint of dizziness    HPI:  This is a 47yoF with no pmhx who presents with chest pain, palpitations and dizziness. Pt states chest pain began 4 days ago was left sided, 9/10 in intensity. Pain was sharp, intermittent, without aggravating or relieving causes and radiated to left shoulder. Patient has had nausea for the past 2 days, but denies any currently. Dizziness began a week ago, feels like the room is spinning, and has no relieving or aggravating factors. NO hearing loss, tinnitus, recent infections or change of medication.      She has had new difficulty of balance with the vertigo, falls to the right side.  Vertigo improved with Meclazine, not resolved yet.    Neurological Review of Systems:  No difficulty with language.  No vision loss or double vision.  No new hearing loss.  No difficulty with speech or swallowing.  No focal weakness.  No focal sensory changes.  No numbness or tingling in the bilateral lower extremities. +difficulty with balance.  No difficulty with ambulation.        MEDICATIONS  (STANDING):    MEDICATIONS  (PRN):  ketorolac   Injectable 15 milliGRAM(s) IV Push every 6 hours PRN Severe Pain (7 - 10)  meclizine 25 milliGRAM(s) Oral every 8 hours PRN Dizziness    Allergies    No Known Allergies    Intolerances      PAST MEDICAL & SURGICAL HISTORY:  Kidney stone      2019 novel coronavirus disease (COVID-19)      No significant past surgical history        FAMILY HISTORY:  FH: heart disease (Mother)      SOCIAL HISTORY: non smoker    Review of Systems:  Constitutional: No fevers or chills.                    Eyes, Ears, Mouth, Throat: No vision loss   Respiratory: No cough.                                Cardiovascular: + chest pain   Gastrointestinal: No nausea or vomiting.                                         Genitourinary: No urinary incontinence  Musculoskeletal: No joint pain.                                                           Dermatologic: No rash.  Neurological: as per HPI                                                                      Psychiatric: No behavioral problems.  Endocrine: No known hypoglycemia.               Hematologic/Lymphatic: No easy bleeding.    O:  Vital Signs Last 24 Hrs  T(C): 36.9 (16 Nov 2022 07:20), Max: 36.9 (16 Nov 2022 07:20)  T(F): 98.5 (16 Nov 2022 07:20), Max: 98.5 (16 Nov 2022 07:20)  HR: 71 (16 Nov 2022 07:20) (71 - 75)  BP: 103/65 (16 Nov 2022 07:20) (103/65 - 128/84)  BP(mean): --  RR: 17 (16 Nov 2022 07:20) (17 - 18)  SpO2: 98% (16 Nov 2022 07:20) (97% - 99%)    Parameters below as of 16 Nov 2022 07:20  Patient On (Oxygen Delivery Method): room air        General Exam:   General appearance: No acute distress                 Cardiovascular: Pedal dorsalis pulses intact bilaterally    Mental Status: Orientated to self, date and place.  Attention intact.  No dysarthria, aphasia or neglect.  Knowledge intact.  Registration intact.  Short and long term memory grossly intact.      Cranial Nerves: CN I - not tested.  PERRL, EOMI, VFF, no nystagmus or diplopia.  No APD.  Fundi not visualized.  CN V1-3 intact to light touch and pinprick.  No facial asymmetry.  Hearing intact to finger rub bilaterally.  Tongue, uvula and palate midline.  Sternocleidomastoid and Trapezius intact bilaterally.    Motor:   Tone: normal.                  Strength intact throughout  No pronator drift bilaterally                      No dysmetria on finger-nose-finger or heel-shin-heel  No truncal ataxia.  No resting, postural or action tremor.  No myoclonus.    Sensation: intact to light touch    Deep Tendon Reflexes: 1+ bilateral biceps, triceps, brachioradialis, knee and ankle  Toes flexor bilaterally    Gait: unsteady falls towards the right side    Other: NIHSS 0, MRS 1    LABS:                        12.6   7.00  )-----------( 308      ( 16 Nov 2022 05:40 )             40.0     11-16    140  |  108  |  19<H>  ----------------------------<  146<H>  4.5   |  26  |  0.77    Ca    8.9      16 Nov 2022 05:40  Phos  4.1     11-16  Mg     2.0     11-16    TPro  7.6  /  Alb  3.2<L>  /  TBili  0.3  /  DBili  x   /  AST  61<H>  /  ALT  123<H>  /  AlkPhos  138<H>  11-16    PT/INR - ( 15 Nov 2022 09:40 )   PT: 13.0 sec;   INR: 1.09 ratio         PTT - ( 15 Nov 2022 09:40 )  PTT:33.2 sec        RADIOLOGY & ADDITIONAL STUDIES:    EKG: < from: 12 Lead ECG (11.15.22 @ 08:08) >    Ventricular Rate 74 BPM    Atrial Rate 74 BPM    P-R Interval 126 ms    QRS Duration 78 ms    Q-T Interval 394 ms    QTC Calculation(Bazett) 437 ms    P Axis -14 degrees    R Axis 10 degrees    T Axis 33 degrees    Diagnosis Line Normal sinus rhythm  Normal ECG    Confirmed by MYLES ALVAREZ, [facility] ([id number]) on 11/16/2022 8:39:43 AM    < end of copied text >  < from: CT Head No Cont (11.15.22 @ 16:01) >  (images reviewed)    ACC: [id number] EXAM:  CT BRAIN                          PROCEDURE DATE:  11/15/2022          INTERPRETATION:  Clinical indications: Dizziness    Technique:  Multiple axial sections were acquired from the base of the skull to the   vertex without contrast enhancement. Coronal and sagittal reconstructed   images were performed as well.    Findings:  The lateral ventricles have a normal configuration.    There is no evidence of acute hemorrhage, mass or mass-effect in the   posterior fossa or in the supratentorial region.    Evaluation of the osseous structures with the appropriate window appears   unremarkable.    The visualized paranasal sinuses mastoid and middle ear regions appear   clear.    Impression:  Unremarkable noncontrast head CT.    --- End of Report ---            SHARRI MELISSA MD; Attending Radiologist  This document has been electronically signed. Nov 15 2022  3:59PM    < end of copied text >

## 2022-11-16 NOTE — CONSULT NOTE ADULT - SUBJECTIVE AND OBJECTIVE BOX
CHIEF COMPLAINT:Patient is a 47y old  Female who presents with a chief complaint of Chest pain,Palpitations,Dizzy.    HPI:  This is a 48 yo Female with no pmhx who presents with chest pain, palpitations and dizziness. Pt states chest pain began 4 days ago was left sided, 9/10 in intensity. Pain was sharp, intermittent, without aggravating or relieving causes and radiated to left shoulder. Patient has had nausea for the past 2 days, but denies any currently. Dizziness began a week ago, feels like the room is spinning, and has no relieving or aggravating factors. Pt had palpitations with CP however it has resolved since. Pt denies current headaches, SOB, fever, chills, N/V/D, dysuria, constipation, numbness or tingling.     ED vitals: T 98.1, HR 75, /83, 100%RA  ED meds: 1LNS, meclizine 25mg, ketorolac 15mg (15 Nov 2022 19:29)      PAST MEDICAL & SURGICAL HISTORY:  Kidney stone      2019 novel coronavirus disease (COVID-19)              MEDICATIONS  (STANDING):    MEDICATIONS  (PRN):  ketorolac   Injectable 15 milliGRAM(s) IV Push every 6 hours PRN Severe Pain (7 - 10)  meclizine 25 milliGRAM(s) Oral every 8 hours PRN Dizziness      FAMILY HISTORY:  FH: heart disease (Mother)        SOCIAL HISTORY:    [ x] Non-smoker    [x ] Alcohol-denies    Allergies    No Known Allergies    Intolerances    	    REVIEW OF SYSTEMS:  CONSTITUTIONAL: No fever, weight loss, or fatigue  EYES: No eye pain, visual disturbances, or discharge  ENT:  No difficulty hearing, tinnitus, vertigo; No sinus or throat pain  NECK: No pain or stiffness  RESPIRATORY: No cough, wheezing, chills or hemoptysis; No Shortness of Breath  CARDIOVASCULAR: + chest pain,+ palpitations,No passing out,+ dizziness, or leg swelling  GASTROINTESTINAL: No abdominal or epigastric pain. No nausea, vomiting, or hematemesis; No diarrhea or constipation. No melena or hematochezia.  GENITOURINARY: No dysuria, frequency, hematuria, or incontinence  NEUROLOGICAL: No headaches, memory loss, loss of strength, numbness, or tremors  SKIN: No itching, burning, rashes, or lesions   LYMPH Nodes: No enlarged glands  ENDOCRINE: No heat or cold intolerance; No hair loss  MUSCULOSKELETAL: No joint pain or swelling; No muscle, back, or extremity pain  PSYCHIATRIC: No depression, anxiety, mood swings, or difficulty sleeping  HEME/LYMPH: No easy bruising, or bleeding gums  ALLERGY AND IMMUNOLOGIC: No hives or eczema	        PHYSICAL EXAM:  T(C): 36.9 (11-16-22 @ 07:20), Max: 36.9 (11-16-22 @ 07:20)  HR: 71 (11-16-22 @ 07:20) (71 - 75)  BP: 103/65 (11-16-22 @ 07:20) (103/65 - 128/84)  RR: 17 (11-16-22 @ 07:20) (17 - 18)  SpO2: 98% (11-16-22 @ 07:20) (97% - 99%)  Wt(kg): --  I&O's Summary      Appearance: Normal	  HEENT:   Normal oral mucosa, PERRL, EOMI	  Lymphatic: No lymphadenopathy  Cardiovascular: Normal S1 S2, No JVD, No murmurs, No edema  Respiratory: Lungs clear to auscultation	  Psychiatry: A & O x 3, Mood & affect appropriate  Gastrointestinal:  Soft, Non-tender, + BS	  Skin: No rashes, No ecchymoses, No cyanosis	  Neurologic: Non-focal  Extremities: Normal range of motion, No clubbing, cyanosis or edema  Vascular: Peripheral pulses palpable 2+ bilaterally    	    ECG:  nsr,nl axis	    	  LABS:	 	      CARDIAC MARKERS ( 15 Nov 2022 13:19 )  x     / x     / 96 U/L / x     / x          Troponin I, High Sensitivity Result: <3.0 ng/L (11-15-22 @ 13:19)  Troponin I, High Sensitivity Result: 4.3 ng/L (11-15-22 @ 09:40)                          12.6   7.00  )-----------( 308      ( 16 Nov 2022 05:40 )             40.0     11-16    140  |  108  |  19<H>  ----------------------------<  146<H>  4.5   |  26  |  0.77    Ca    8.9      16 Nov 2022 05:40  Phos  4.1     11-16  Mg     2.0     11-16    TPro  7.6  /  Alb  3.2<L>  /  TBili  0.3  /  DBili  x   /  AST  61<H>  /  ALT  123<H>  /  AlkPhos  138<H>  11-16        < from: CT Head No Cont (11.15.22 @ 16:01) >  ACC: [id number] EXAM:  CT BRAIN                          PROCEDURE DATE:  11/15/2022          INTERPRETATION:  Clinical indications: Dizziness    Technique:  Multiple axial sections were acquired from the base of the skull to the   vertex without contrast enhancement. Coronal and sagittal reconstructed   images were performed as well.    Findings:  The lateral ventricles have a normal configuration.    There is no evidence of acute hemorrhage, mass or mass-effect in the   posterior fossa or in the supratentorial region.    Evaluation of the osseous structures with the appropriate window appears   unremarkable.    The visualized paranasal sinuses mastoid and middle ear regions appear   clear.    Impression:  Unremarkable noncontrast head CT.      < end of copied text >  < from: Xray Chest 2 Views PA/Lat (11.15.22 @ 09:54) >    ACC: [id number] EXAM:  XR CHEST PA LAT 2V                          PROCEDURE DATE:  11/15/2022          INTERPRETATION:  INDICATION: Chest pain    PA and lateral chest    COMPARISON: None    FINDINGS:  Heart/Vascular: The heart size, mediastinum, hilum and aorta are normal.  Pulmonary: The lungs are clear. There is no pleural effusion.  Bones: No fracture.  Trachea midline.    Impression:    There is no acute pulmonary disease.    < end of copied text >

## 2022-11-16 NOTE — PATIENT PROFILE ADULT - FALL HARM RISK - HARM RISK INTERVENTIONS
Assistance with ambulation/Assistance OOB with selected safe patient handling equipment/Communicate Risk of Fall with Harm to all staff/Discuss with provider need for PT consult/Monitor gait and stability/Provide patient with walking aids - walker, cane, crutches/Reinforce activity limits and safety measures with patient and family/Sit up slowly, dangle for a short time, stand at bedside before walking/Tailored Fall Risk Interventions/Visual Cue: Yellow wristband and red socks/Bed in lowest position, wheels locked, appropriate side rails in place/Call bell, personal items and telephone in reach/Instruct patient to call for assistance before getting out of bed or chair/Non-slip footwear when patient is out of bed/[place] to call system/Physically safe environment - no spills, clutter or unnecessary equipment/Purposeful Proactive Rounding/Room/bathroom lighting operational, light cord in reach

## 2022-11-17 ENCOUNTER — TRANSCRIPTION ENCOUNTER (OUTPATIENT)
Age: 47
End: 2022-11-17

## 2022-11-17 VITALS
RESPIRATION RATE: 18 BRPM | DIASTOLIC BLOOD PRESSURE: 76 MMHG | SYSTOLIC BLOOD PRESSURE: 115 MMHG | OXYGEN SATURATION: 97 % | HEART RATE: 88 BPM | TEMPERATURE: 100 F

## 2022-11-17 LAB
ALBUMIN SERPL ELPH-MCNC: 3.4 G/DL — LOW (ref 3.5–5)
ALP SERPL-CCNC: 160 U/L — HIGH (ref 40–120)
ALT FLD-CCNC: 127 U/L DA — HIGH (ref 10–60)
ANION GAP SERPL CALC-SCNC: 4 MMOL/L — LOW (ref 5–17)
AST SERPL-CCNC: 65 U/L — HIGH (ref 10–40)
BILIRUB SERPL-MCNC: 0.3 MG/DL — SIGNIFICANT CHANGE UP (ref 0.2–1.2)
BUN SERPL-MCNC: 15 MG/DL — SIGNIFICANT CHANGE UP (ref 7–18)
CALCIUM SERPL-MCNC: 9.1 MG/DL — SIGNIFICANT CHANGE UP (ref 8.4–10.5)
CHLORIDE SERPL-SCNC: 107 MMOL/L — SIGNIFICANT CHANGE UP (ref 96–108)
CHOLEST SERPL-MCNC: 246 MG/DL — HIGH
CO2 SERPL-SCNC: 27 MMOL/L — SIGNIFICANT CHANGE UP (ref 22–31)
CREAT SERPL-MCNC: 0.73 MG/DL — SIGNIFICANT CHANGE UP (ref 0.5–1.3)
EGFR: 102 ML/MIN/1.73M2 — SIGNIFICANT CHANGE UP
GLUCOSE SERPL-MCNC: 145 MG/DL — HIGH (ref 70–99)
HDLC SERPL-MCNC: 36 MG/DL — LOW
LIPID PNL WITH DIRECT LDL SERPL: 163 MG/DL — HIGH
NON HDL CHOLESTEROL: 210 MG/DL — HIGH
POTASSIUM SERPL-MCNC: 4.1 MMOL/L — SIGNIFICANT CHANGE UP (ref 3.5–5.3)
POTASSIUM SERPL-SCNC: 4.1 MMOL/L — SIGNIFICANT CHANGE UP (ref 3.5–5.3)
PROT SERPL-MCNC: 7.7 G/DL — SIGNIFICANT CHANGE UP (ref 6–8.3)
SODIUM SERPL-SCNC: 138 MMOL/L — SIGNIFICANT CHANGE UP (ref 135–145)
TRIGL SERPL-MCNC: 236 MG/DL — HIGH

## 2022-11-17 PROCEDURE — 85025 COMPLETE CBC W/AUTO DIFF WBC: CPT

## 2022-11-17 PROCEDURE — 93306 TTE W/DOPPLER COMPLETE: CPT

## 2022-11-17 PROCEDURE — 80061 LIPID PANEL: CPT

## 2022-11-17 PROCEDURE — 97161 PT EVAL LOW COMPLEX 20 MIN: CPT

## 2022-11-17 PROCEDURE — 93005 ELECTROCARDIOGRAM TRACING: CPT

## 2022-11-17 PROCEDURE — 84702 CHORIONIC GONADOTROPIN TEST: CPT

## 2022-11-17 PROCEDURE — 71046 X-RAY EXAM CHEST 2 VIEWS: CPT

## 2022-11-17 PROCEDURE — 84100 ASSAY OF PHOSPHORUS: CPT

## 2022-11-17 PROCEDURE — 83880 ASSAY OF NATRIURETIC PEPTIDE: CPT

## 2022-11-17 PROCEDURE — 87635 SARS-COV-2 COVID-19 AMP PRB: CPT

## 2022-11-17 PROCEDURE — 70551 MRI BRAIN STEM W/O DYE: CPT

## 2022-11-17 PROCEDURE — 80053 COMPREHEN METABOLIC PANEL: CPT

## 2022-11-17 PROCEDURE — 36415 COLL VENOUS BLD VENIPUNCTURE: CPT

## 2022-11-17 PROCEDURE — 70450 CT HEAD/BRAIN W/O DYE: CPT | Mod: MA

## 2022-11-17 PROCEDURE — 84484 ASSAY OF TROPONIN QUANT: CPT

## 2022-11-17 PROCEDURE — 82962 GLUCOSE BLOOD TEST: CPT

## 2022-11-17 PROCEDURE — 99285 EMERGENCY DEPT VISIT HI MDM: CPT | Mod: 25

## 2022-11-17 PROCEDURE — 83735 ASSAY OF MAGNESIUM: CPT

## 2022-11-17 PROCEDURE — 70551 MRI BRAIN STEM W/O DYE: CPT | Mod: 26

## 2022-11-17 PROCEDURE — 85610 PROTHROMBIN TIME: CPT

## 2022-11-17 PROCEDURE — 85379 FIBRIN DEGRADATION QUANT: CPT

## 2022-11-17 PROCEDURE — 84443 ASSAY THYROID STIM HORMONE: CPT

## 2022-11-17 PROCEDURE — 93306 TTE W/DOPPLER COMPLETE: CPT | Mod: 26

## 2022-11-17 PROCEDURE — 85730 THROMBOPLASTIN TIME PARTIAL: CPT

## 2022-11-17 PROCEDURE — 82550 ASSAY OF CK (CPK): CPT

## 2022-11-17 RX ORDER — MECLIZINE HCL 12.5 MG
1 TABLET ORAL
Qty: 90 | Refills: 0
Start: 2022-11-17 | End: 2022-12-16

## 2022-11-17 RX ORDER — IBUPROFEN 200 MG
1 TABLET ORAL
Qty: 0 | Refills: 0 | DISCHARGE
Start: 2022-11-17

## 2022-11-17 RX ORDER — BENZOCAINE AND MENTHOL 5; 1 G/100ML; G/100ML
1 LIQUID ORAL EVERY 4 HOURS
Refills: 0 | Status: DISCONTINUED | OUTPATIENT
Start: 2022-11-17 | End: 2022-11-17

## 2022-11-17 RX ORDER — ATORVASTATIN CALCIUM 80 MG/1
1 TABLET, FILM COATED ORAL
Qty: 30 | Refills: 0
Start: 2022-11-17 | End: 2022-12-16

## 2022-11-17 RX ORDER — CLOPIDOGREL BISULFATE 75 MG/1
75 TABLET, FILM COATED ORAL DAILY
Refills: 0 | Status: DISCONTINUED | OUTPATIENT
Start: 2022-11-17 | End: 2022-11-17

## 2022-11-17 RX ORDER — CLOPIDOGREL BISULFATE 75 MG/1
1 TABLET, FILM COATED ORAL
Qty: 30 | Refills: 0
Start: 2022-11-17 | End: 2022-12-16

## 2022-11-17 RX ORDER — ASPIRIN/CALCIUM CARB/MAGNESIUM 324 MG
81 TABLET ORAL DAILY
Refills: 0 | Status: DISCONTINUED | OUTPATIENT
Start: 2022-11-17 | End: 2022-11-17

## 2022-11-17 RX ORDER — ASPIRIN/CALCIUM CARB/MAGNESIUM 324 MG
1 TABLET ORAL
Qty: 30 | Refills: 0
Start: 2022-11-17 | End: 2022-12-16

## 2022-11-17 RX ORDER — ATORVASTATIN CALCIUM 80 MG/1
40 TABLET, FILM COATED ORAL AT BEDTIME
Refills: 0 | Status: DISCONTINUED | OUTPATIENT
Start: 2022-11-17 | End: 2022-11-17

## 2022-11-17 RX ORDER — IBUPROFEN 200 MG
400 TABLET ORAL EVERY 6 HOURS
Refills: 0 | Status: DISCONTINUED | OUTPATIENT
Start: 2022-11-17 | End: 2022-11-17

## 2022-11-17 RX ADMIN — Medication 1 MILLIGRAM(S): at 11:49

## 2022-11-17 RX ADMIN — Medication 400 MILLIGRAM(S): at 15:00

## 2022-11-17 RX ADMIN — CLOPIDOGREL BISULFATE 75 MILLIGRAM(S): 75 TABLET, FILM COATED ORAL at 14:27

## 2022-11-17 RX ADMIN — Medication 81 MILLIGRAM(S): at 14:27

## 2022-11-17 RX ADMIN — Medication 400 MILLIGRAM(S): at 14:28

## 2022-11-17 NOTE — DISCHARGE NOTE NURSING/CASE MANAGEMENT/SOCIAL WORK - PATIENT PORTAL LINK FT
You can access the FollowMyHealth Patient Portal offered by [facility] by registering at the following website: [email or web address]. By joining [facility]’s FollowMyHealth portal, you will also be able to view your health information using other applications (apps) compatible with our system.

## 2022-11-17 NOTE — PROGRESS NOTE ADULT - SUBJECTIVE AND OBJECTIVE BOX
CHIEF COMPLAINT:Patient is a 47y old  Female who presents with a chief complaint of dizziness, chest pain and palpitation.Pt appears comfortable.    	  REVIEW OF SYSTEMS:  CONSTITUTIONAL: No fever, weight loss, or fatigue  EYES: No eye pain, visual disturbances, or discharge  ENT:  No difficulty hearing, tinnitus, vertigo; No sinus or throat pain  NECK: No pain or stiffness  RESPIRATORY: No cough, wheezing, chills or hemoptysis; No Shortness of Breath  CARDIOVASCULAR: No chest pain, palpitations, passing out, dizziness, or leg swelling  GASTROINTESTINAL: No abdominal or epigastric pain. No nausea, vomiting, or hematemesis; No diarrhea or constipation. No melena or hematochezia.  GENITOURINARY: No dysuria, frequency, hematuria, or incontinence  NEUROLOGICAL: No headaches, memory loss, loss of strength, numbness, or tremors  SKIN: No itching, burning, rashes, or lesions   LYMPH Nodes: No enlarged glands  ENDOCRINE: No heat or cold intolerance; No hair loss  MUSCULOSKELETAL: No joint pain or swelling; No muscle, back, or extremity pain  PSYCHIATRIC: No depression, anxiety, mood swings, or difficulty sleeping  HEME/LYMPH: No easy bruising, or bleeding gums  ALLERGY AND IMMUNOLOGIC: No hives or eczema	      PHYSICAL EXAM:  T(C): 37 (11-17-22 @ 10:01), Max: 37.1 (11-16-22 @ 21:07)  HR: 91 (11-17-22 @ 10:25) (73 - 91)  BP: 135/79 (11-17-22 @ 10:25) (105/68 - 135/79)  RR: 16 (11-17-22 @ 10:01) (16 - 18)  SpO2: 97% (11-17-22 @ 10:25) (97% - 100%)  Wt(kg): --  I&O's Summary      Appearance: Normal	  HEENT:   Normal oral mucosa, PERRL, EOMI	  Lymphatic: No lymphadenopathy  Cardiovascular: Normal S1 S2, No JVD, No murmurs, No edema  Respiratory: Lungs clear to auscultation	  Psychiatry: A & O x 3, Mood & affect appropriate  Gastrointestinal:  Soft, Non-tender, + BS	  Skin: No rashes, No ecchymoses, No cyanosis	  Neurologic: Non-focal  Extremities: Normal range of motion, No clubbing, cyanosis or edema  Vascular: Peripheral pulses palpable 2+ bilaterally    MEDICATIONS  (STANDING):  aspirin enteric coated 81 milliGRAM(s) Oral daily  atorvastatin 40 milliGRAM(s) Oral at bedtime  clopidogrel Tablet 75 milliGRAM(s) Oral daily  LORazepam   Injectable 1 milliGRAM(s) IV Push once        	  LABS:	 	    CARDIAC MARKERS ( 15 Nov 2022 13:19 )  x     / x     / 96 U/L / x     / x          Troponin I, High Sensitivity Result: <3.0 ng/L (11-15 @ 13:19)  Troponin I, High Sensitivity Result: 4.3 ng/L (11-15 @ 09:40)                            12.6   7.00  )-----------( 308      ( 16 Nov 2022 05:40 )             40.0     11-17    138  |  107  |  15  ----------------------------<  145<H>  4.1   |  27  |  0.73    Ca    9.1      17 Nov 2022 05:28  Phos  4.1     11-16  Mg     2.0     11-16    TPro  7.7  /  Alb  3.4<L>  /  TBili  0.3  /  DBili  x   /  AST  65<H>  /  ALT  127<H>  /  AlkPhos  160<H>  11-17    proBNP: Serum Pro-Brain Natriuretic Peptide: 10 pg/mL (11-15 @ 09:40)    Lipid Profile: Cholesterol 246  HDL 36    Ldl calc 163    TSH: Thyroid Stimulating Hormone, Serum: 2.77 uU/mL (11-15 @ 09:40)      	        
Patient was seen and examined  Patient is a 47y old  Female who presents with a chief complaint of Dizzy (16 Nov 2022 12:04)      INTERVAL HPI/OVERNIGHT EVENTS:  T(C): 36.7 (11-16-22 @ 12:00), Max: 36.9 (11-16-22 @ 07:20)  HR: 79 (11-16-22 @ 12:00) (71 - 79)  BP: 105/68 (11-16-22 @ 12:00) (103/65 - 128/84)  RR: 17 (11-16-22 @ 12:00) (17 - 18)  SpO2: 97% (11-16-22 @ 12:00) (97% - 99%)  Wt(kg): --  I&O's Summary      LABS:                        12.6   7.00  )-----------( 308      ( 16 Nov 2022 05:40 )             40.0     11-16    140  |  108  |  19<H>  ----------------------------<  146<H>  4.5   |  26  |  0.77    Ca    8.9      16 Nov 2022 05:40  Phos  4.1     11-16  Mg     2.0     11-16    TPro  7.6  /  Alb  3.2<L>  /  TBili  0.3  /  DBili  x   /  AST  61<H>  /  ALT  123<H>  /  AlkPhos  138<H>  11-16    PT/INR - ( 15 Nov 2022 09:40 )   PT: 13.0 sec;   INR: 1.09 ratio         PTT - ( 15 Nov 2022 09:40 )  PTT:33.2 sec    CAPILLARY BLOOD GLUCOSE                  MEDICATIONS  (STANDING):  LORazepam   Injectable 1 milliGRAM(s) IV Push once    MEDICATIONS  (PRN):  ketorolac   Injectable 15 milliGRAM(s) IV Push every 6 hours PRN Severe Pain (7 - 10)  meclizine 25 milliGRAM(s) Oral every 8 hours PRN Dizziness      RADIOLOGY & ADDITIONAL TESTS:    Imaging Personally Reviewed:  [ ] YES  [ ] NO    REVIEW OF SYSTEMS:  CONSTITUTIONAL: No fever, weight loss, or fatigue  EYES: No eye pain, visual disturbances, or discharge  ENMT:  No difficulty hearing, tinnitus, vertigo; No sinus or throat pain  NECK: No pain or stiffness  BREASTS: No pain, masses, or nipple discharge  RESPIRATORY: No cough, wheezing, chills or hemoptysis; No shortness of breath  CARDIOVASCULAR: No chest pain, palpitations, dizziness, or leg swelling  GASTROINTESTINAL: No abdominal or epigastric pain. No nausea, vomiting, or hematemesis; No diarrhea or constipation. No melena or hematochezia.  GENITOURINARY: No dysuria, frequency, hematuria, or incontinence  NEUROLOGICAL: No headaches, memory loss, loss of strength, numbness, or tremors  SKIN: No itching, burning, rashes, or lesions   LYMPH NODES: No enlarged glands  ENDOCRINE: No heat or cold intolerance; No hair loss  MUSCULOSKELETAL: No joint pain or swelling; No muscle, back, or extremity pain  PSYCHIATRIC: No depression, anxiety, mood swings, or difficulty sleeping  HEME/LYMPH: No easy bruising, or bleeding gums  ALLERY AND IMMUNOLOGIC: No hives or eczema      Consultant(s) Notes Reviewed:  [ ] YES  [ ] NO    PHYSICAL EXAM:  GENERAL: NAD, well-groomed, well-developed  HEAD:  Atraumatic, Normocephalic  EYES: EOMI, PERRLA, conjunctiva and sclera clear  ENMT: No tonsillar erythema, exudates, or enlargement; Moist mucous membranes, Good dentition, No lesions  NECK: Supple, No JVD, Normal thyroid  NERVOUS SYSTEM:  Alert & Oriented X3, Good concentration; Motor Strength 5/5 B/L upper and lower extremities; DTRs 2+ intact and symmetric  CHEST/LUNG: Clear to percussion bilaterally; No rales, rhonchi, wheezing, or rubs  HEART: Regular rate and rhythm; No murmurs, rubs, or gallops  ABDOMEN: Soft, Nontender, Nondistended; Bowel sounds present  EXTREMITIES:  2+ Peripheral Pulses, No clubbing, cyanosis, or edema  LYMPH: No lymphadenopathy noted  SKIN: No rashes or lesions    Care Discussed with Consultants/Other Providers [ x] YES  [ ] NO
Patient is a 47y old  Female who presents with a chief complaint of Dizzy (16 Nov 2022 12:04)      INTERVAL HPI/OVERNIGHT EVENTS: no overnight events    I&O's Summary    Vital Signs Last 24 Hrs  T(C): 36.4 (16 Nov 2022 15:43), Max: 36.9 (16 Nov 2022 07:20)  T(F): 97.5 (16 Nov 2022 15:43), Max: 98.5 (16 Nov 2022 07:20)  HR: 87 (16 Nov 2022 15:43) (71 - 87)  BP: 117/77 (16 Nov 2022 15:43) (103/65 - 128/84)  BP(mean): --  RR: 16 (16 Nov 2022 15:43) (16 - 18)  SpO2: 97% (16 Nov 2022 15:43) (97% - 99%)    Parameters below as of 16 Nov 2022 15:43  Patient On (Oxygen Delivery Method): room air      PAST MEDICAL & SURGICAL HISTORY:  Kidney stone      2019 novel coronavirus disease (COVID-19)      No significant past surgical history          SOCIAL HISTORY  Alcohol:  Tobacco:  Illicit substance use:      FAMILY HISTORY:      LABS:                        12.6   7.00  )-----------( 308      ( 16 Nov 2022 05:40 )             40.0     11-16    140  |  108  |  19<H>  ----------------------------<  146<H>  4.5   |  26  |  0.77    Ca    8.9      16 Nov 2022 05:40  Phos  4.1     11-16  Mg     2.0     11-16    TPro  7.6  /  Alb  3.2<L>  /  TBili  0.3  /  DBili  x   /  AST  61<H>  /  ALT  123<H>  /  AlkPhos  138<H>  11-16    PT/INR - ( 15 Nov 2022 09:40 )   PT: 13.0 sec;   INR: 1.09 ratio         PTT - ( 15 Nov 2022 09:40 )  PTT:33.2 sec    CAPILLARY BLOOD GLUCOSE        MEDICATIONS  (STANDING):  LORazepam   Injectable 1 milliGRAM(s) IV Push once    MEDICATIONS  (PRN):  ketorolac   Injectable 15 milliGRAM(s) IV Push every 6 hours PRN Severe Pain (7 - 10)  meclizine 25 milliGRAM(s) Oral every 8 hours PRN Dizziness      REVIEW OF SYSTEMS:  CONSTITUTIONAL: No fever  EYES: No eye pain, visual disturbances  ENMT:  + vertigo; No sinus or throat pain  NECK: No pain  RESPIRATORY: No cough, No shortness of breath  CARDIOVASCULAR: + chest pain. no palpitations, + dizziness  GASTROINTESTINAL: No abdominal pain. No nausea, vomiting,No diarrhea or constipation.   GENITOURINARY: No dysuria,  NEUROLOGICAL: No headaches, loss of strength, numbness, or tremors  SKIN: No rashes,  MUSCULOSKELETAL: No joint pain    RADIOLOGY & ADDITIONAL TESTS:  < from: Xray Chest 2 Views PA/Lat (11.15.22 @ 09:54) >    ACC: [id number] EXAM:  XR CHEST PA LAT 2V                          PROCEDURE DATE:  11/15/2022          INTERPRETATION:  INDICATION: Chest pain    PA and lateral chest    COMPARISON: None    FINDINGS:  Heart/Vascular: The heart size, mediastinum, hilum and aorta are normal.  Pulmonary: The lungs are clear. There is no pleural effusion.  Bones: No fracture.  Trachea midline.    Impression:    There is no acute pulmonary disease.        --- End of Report ---     NATALIIA AGUIRRE DO; Attending Radiologist  This document has been electronically signed. Nov 15 2022 11:07AM    < end of copied text >    ACC: [id number] EXAM:  CT BRAIN                          PROCEDURE DATE:  11/15/2022          INTERPRETATION:  Clinical indications: Dizziness    Technique:  Multiple axial sections were acquired from the base of the skull to the   vertex without contrast enhancement. Coronal and sagittal reconstructed   images were performed as well.    Findings:  The lateral ventricles have a normal configuration.    There is no evidence of acute hemorrhage, mass or mass-effect in the   posterior fossa or in the supratentorial region.    Evaluation of the osseous structures with the appropriate window appears   unremarkable.    The visualized paranasal sinuses mastoid and middle ear regions appear   clear.    Impression:  Unremarkable noncontrast head CT.    --- End of Report ---      SHARRI MELISSA MD; Attending Radiologist  This document has been electronically signed. Nov 15 2022  3:59PM    Imaging Personally Reviewed:  [ x] YES  [ ] NO    Consultant(s) Notes Reviewed:  [x ] YES  [ ] NO    PHYSICAL EXAM:  GENERAL: NAD  HEAD:  Atraumatic, Normocephalic  EYES:conjunctiva and sclera clear  ENMT: Moist mucous membranelesions  NECK: Supple  NERVOUS SYSTEM:  Alert & Oriented X3, Good concentration;( [place]: [id number])  CHEST/LUNG: CTA bilaterally  HEART: Regular rate and rhythm  ABDOMEN: Soft, Nontender, Nondistended; Bowel sounds present  EXTREMITIES:  2+ Peripheral Pulses  SKIN: No rashes     Care Collaborated Discussed with Consultants/Other Providers [ x] YES  [ ] NO
NP Note discussed with  Primary Attending    Patient is a 47y old  Female who presents with a chief complaint of Dizzy (17 Nov 2022 11:43)      INTERVAL HPI/OVERNIGHT EVENTS: intermittent vertigo and room spinning     MEDICATIONS  (STANDING):  aspirin enteric coated 81 milliGRAM(s) Oral daily  atorvastatin 40 milliGRAM(s) Oral at bedtime  clopidogrel Tablet 75 milliGRAM(s) Oral daily    MEDICATIONS  (PRN):  benzocaine 15 mG/menthol 3.6 mG Lozenge 1 Lozenge Oral every 4 hours PRN Sore Throat  ibuprofen  Tablet. 400 milliGRAM(s) Oral every 6 hours PRN Temp greater or equal to 38C (100.4F), Mild Pain (1 - 3), Moderate Pain (4 - 6)  meclizine 25 milliGRAM(s) Oral every 8 hours PRN Dizziness      __________________________________________________  REVIEW OF SYSTEMS:    CONSTITUTIONAL: No fever,   EYES: denies any acute visual disturbances, no diplopia or blurriness  NECK: No pain or stiffness  RESPIRATORY: No cough; No shortness of breath  CARDIOVASCULAR: No chest pain, no palpitations  GASTROINTESTINAL: No pain. No nausea or vomiting; No diarrhea   NEUROLOGICAL: No headache or numbness, no tremors, intermittent vertigo and room spinning   MUSCULOSKELETAL: No joint pain, no muscle pain  GENITOURINARY: no dysuria, no frequency, no hesitancy  PSYCHIATRY: no depression , no anxiety  ALL OTHER  ROS negative        Vital Signs Last 24 Hrs  T(C): 37.1 (17 Nov 2022 13:48), Max: 37.1 (16 Nov 2022 21:07)  T(F): 98.8 (17 Nov 2022 13:48), Max: 98.8 (17 Nov 2022 13:48)  HR: 90 (17 Nov 2022 13:48) (73 - 91)  BP: 119/83 (17 Nov 2022 13:48) (115/60 - 135/79)  BP(mean): --  RR: 18 (17 Nov 2022 13:48) (16 - 18)  SpO2: 98% (17 Nov 2022 13:48) (97% - 100%)    Parameters below as of 17 Nov 2022 13:48  Patient On (Oxygen Delivery Method): room air        ________________________________________________  PHYSICAL EXAM:  GENERAL: NAD  HEENT: atraumatic, normocephalic;  conjunctivae and sclerae clear; moist mucous membranes; no hearing defecits  NECK : supple, no JVD  CHEST/LUNG: Clear to auscultation bilaterally with good air entry   HEART: S1 S2 RRR; no murmurs, gallops or rubs  ABDOMEN: Soft, Nontender, Nondistended; Bowel sounds present all quadrants  EXTREMITIES: no cyanosis; no edema; no calf tenderness, 2+ radial and pedal pulses  SKIN: warm and dry; no rash  NERVOUS SYSTEM:  Awake and alert; Oriented  to place, person and time; intermittent vertigo and room spinning associated with change in position of head or lateral eye movement      _________________________________________________  LABS:                        12.6   7.00  )-----------( 308      ( 16 Nov 2022 05:40 )             40.0     11-17    138  |  107  |  15  ----------------------------<  145<H>  4.1   |  27  |  0.73    Ca    9.1      17 Nov 2022 05:28  Phos  4.1     11-16  Mg     2.0     11-16    TPro  7.7  /  Alb  3.4<L>  /  TBili  0.3  /  DBili  x   /  AST  65<H>  /  ALT  127<H>  /  AlkPhos  160<H>  11-17        CAPILLARY BLOOD GLUCOSE            RADIOLOGY & ADDITIONAL TESTS:    Imaging  Reviewed:    < from: CT Head No Cont (11.15.22 @ 16:01) >  Findings:  The lateral ventricles have a normal configuration.    There is no evidence of acute hemorrhage, mass or mass-effect in the   posterior fossa or in the supratentorial region.    Evaluation of the osseous structures with the appropriate window appears   unremarkable.    The visualized paranasal sinuses mastoid and middle ear regions appear   clear.    Impression:  Unremarkable noncontrast head CT.      < end of copied text >  < from: Xray Chest 2 Views PA/Lat (11.15.22 @ 09:54) >  FINDINGS:  Heart/Vascular: The heart size, mediastinum, hilum and aorta are normal.  Pulmonary: The lungs are clear. There is no pleural effusion.  Bones: No fracture.  Trachea midline.    Impression:    There is no acute pulmonary disease.      < end of copied text >      Consultant(s) Notes Reviewed:   YES      Plan of care was discussed with patient and /or primary care giver; all questions and concerns were addressed 
Patient was seen and examined  Patient is a 47y old  Female who presents with a chief complaint of Dizzy (17 Nov 2022 11:43)      INTERVAL HPI/OVERNIGHT EVENTS:  T(C): 37 (11-17-22 @ 10:01), Max: 37.1 (11-16-22 @ 21:07)  HR: 91 (11-17-22 @ 10:25) (73 - 91)  BP: 135/79 (11-17-22 @ 10:25) (115/60 - 135/79)  RR: 16 (11-17-22 @ 10:01) (16 - 18)  SpO2: 97% (11-17-22 @ 10:25) (97% - 100%)  Wt(kg): --  I&O's Summary      LABS:                        12.6   7.00  )-----------( 308      ( 16 Nov 2022 05:40 )             40.0     11-17    138  |  107  |  15  ----------------------------<  145<H>  4.1   |  27  |  0.73    Ca    9.1      17 Nov 2022 05:28  Phos  4.1     11-16  Mg     2.0     11-16    TPro  7.7  /  Alb  3.4<L>  /  TBili  0.3  /  DBili  x   /  AST  65<H>  /  ALT  127<H>  /  AlkPhos  160<H>  11-17        CAPILLARY BLOOD GLUCOSE        LIPID PANEL  Cholesterol 246  LDL --  HDL 36  RATIO HDL/Total Cholesterol --  Triglyceride 236            MEDICATIONS  (STANDING):  aspirin enteric coated 81 milliGRAM(s) Oral daily  atorvastatin 40 milliGRAM(s) Oral at bedtime  clopidogrel Tablet 75 milliGRAM(s) Oral daily    MEDICATIONS  (PRN):  benzocaine 15 mG/menthol 3.6 mG Lozenge 1 Lozenge Oral every 4 hours PRN Sore Throat  ibuprofen  Tablet. 400 milliGRAM(s) Oral every 6 hours PRN Temp greater or equal to 38C (100.4F), Mild Pain (1 - 3), Moderate Pain (4 - 6)  meclizine 25 milliGRAM(s) Oral every 8 hours PRN Dizziness      RADIOLOGY & ADDITIONAL TESTS:    Imaging Personally Reviewed:  [ ] YES  [ ] NO    REVIEW OF SYSTEMS:  CONSTITUTIONAL: No fever, weight loss, or fatigue  EYES: No eye pain, visual disturbances, or discharge  ENMT:  No difficulty hearing, tinnitus, vertigo; No sinus or throat pain  NECK: No pain or stiffness  BREASTS: No pain, masses, or nipple discharge  RESPIRATORY: No cough, wheezing, chills or hemoptysis; No shortness of breath  CARDIOVASCULAR: No chest pain, palpitations, dizziness, or leg swelling  GASTROINTESTINAL: No abdominal or epigastric pain. No nausea, vomiting, or hematemesis; No diarrhea or constipation. No melena or hematochezia.  GENITOURINARY: No dysuria, frequency, hematuria, or incontinence  NEUROLOGICAL: No headaches, memory loss, loss of strength, numbness, or tremors  SKIN: No itching, burning, rashes, or lesions   LYMPH NODES: No enlarged glands  ENDOCRINE: No heat or cold intolerance; No hair loss  MUSCULOSKELETAL: No joint pain or swelling; No muscle, back, or extremity pain  PSYCHIATRIC: No depression, anxiety, mood swings, or difficulty sleeping  HEME/LYMPH: No easy bruising, or bleeding gums  ALLERY AND IMMUNOLOGIC: No hives or eczema      Consultant(s) Notes Reviewed:  [ x ] YES  [ ] NO    PHYSICAL EXAM:  GENERAL: NAD, well-groomed, well-developed  HEAD:  Atraumatic, Normocephalic  EYES: EOMI, PERRLA, conjunctiva and sclera clear  ENMT: No tonsillar erythema, exudates, or enlargement; Moist mucous membranes, Good dentition, No lesions  NECK: Supple, No JVD, Normal thyroid  NERVOUS SYSTEM:  Alert & Oriented X3, Good concentration; Motor Strength 5/5 B/L upper and lower extremities; DTRs 2+ intact and symmetric  CHEST/LUNG: Clear to percussion bilaterally; No rales, rhonchi, wheezing, or rubs  HEART: Regular rate and rhythm; No murmurs, rubs, or gallops  ABDOMEN: Soft, Nontender, Nondistended; Bowel sounds present  EXTREMITIES:  2+ Peripheral Pulses, No clubbing, cyanosis, or edema  LYMPH: No lymphadenopathy noted  SKIN: No rashes or lesions    Care Discussed with Consultants/Other Providers [ x] YES  [ ] NO

## 2022-11-17 NOTE — PROGRESS NOTE ADULT - ASSESSMENT
47yoF with no pmhx who presents with chest pain, palpitations and dizziness. Admitted to medicine for r/o CVA. Cth negative, EKG neg, Trop neg x 2. started on meclizine. neuro Dr. Bass consulted [facility] MRI [place], non con, f/u report and awaiting ISAAC.
 46 yo Female with no pmhx who presents with chest pain, palpitations and dizziness.   1.Tele monitoring.  2.Echocardiogram.  3.Neurology eval noted,await MRI.  4.Othostatic BP.  5.Lipid d/o-statin.  6.GI and DVT prophylaxis
This is a 47yoF with no pmhx who presents with chest pain, palpitations and dizziness. Admitted for intractable dizziness.
47yoF with no pmhx who presents with chest pain, palpitations and dizziness. admitted r/o cva. Cth negative, EKG nsr, Trop neg x 2. started on meclizine. neuro agopian consulted [facility] MRI [place], f/u report. 
47yoF with no pmhx who presents with chest pain, palpitations and dizziness. admitted r/o cva. Cth negative, EKG nsr, Trop neg x 2. started on meclizine. neuro agopian consulted [facility] MRI [place], f/u report.

## 2022-11-17 NOTE — PHYSICAL THERAPY INITIAL EVALUATION ADULT - LEVEL OF INDEPENDENCE: STAIR NEGOTIATION, REHAB EVAL
states is (I). Mid-Level Procedure Text (A): After obtaining clear surgical margins the patient was sent to a mid-level provider for surgical repair.  The patient understands they will receive post-surgical care and follow-up from the mid-level provider.

## 2022-11-17 NOTE — DISCHARGE NOTE PROVIDER - HOSPITAL COURSE
47yoF with no pmhx who presents with chest pain, palpitations and dizziness. admitted r/o cva. Cth negative, EKG nsr, Trop neg x 2. started on meclizine. neuro agopian consulted  MRI brain confirmed migraines and no acute pathology . Reccs to continue meclazine as needed and outpatient ENT follow up   Discharge discussed with attending   This is only a brief summary of patient's hospital stay, for full course please see EMR

## 2022-11-17 NOTE — DISCHARGE NOTE PROVIDER - CARE PROVIDER_API CALL
Kevin Santizo)  [facility]  [address] [place] [address], [place] [address]  Phone: [phone]  Fax: [phone]  Follow Up Time: 1 week

## 2022-11-17 NOTE — DISCHARGE NOTE PROVIDER - NSFOLLOWUPCLINICS_GEN_ALL_ED_FT
[facility]y [facility] E[facility]  ENT  [address] [place] [address]  Phone: [phone]  Fax: [phone]  Follow Up Time: 1 week

## 2022-11-17 NOTE — PROGRESS NOTE ADULT - PROBLEM SELECTOR PLAN 2
noted AST 61/ / Alk shalonda 138  no c/o abdominal pain  monitor cmp
noted AST 61/ / Alk shalonda 138  no c/o abdominal pain  monitor cmp
dvt ppx not indicated, pt ambulatory with no risk factors
see above,  c/w meclizine

## 2022-11-17 NOTE — PROGRESS NOTE ADULT - PROBLEM SELECTOR PLAN 1
p/w dizziness associated with CP w/dyspnea  -Chest pain resolved after ketoralac in ED  -EKG NSR  - orthostatic BP noted  - Trop negative x2  - C/w Meclizine PRN, switch to prn when the vertigo improves.-neuro recc  - PT eval.  - pending echo , mri brain, f/u reports  - Cardio Dr Barnard recc: ECHO  - Neuro Dr. Bass recc:  MRI brain.  IF there is acute stroke,--> MRA head, CD, TTE, LDL, start asa, plavix and statin for secondary stroke prevention.
Pt presented with dizziness associated with CP w/dyspnea  Chest pain resolved after ketoralac in ED  Given 25mg Meclizine in ED   Dizziness likely due to BPPV (sxs improved with meclizine) vs less likely cardiac etiology  EKG NSR  Trop negative x2    C/w Meclizine PRN    Cardio Dr Barnard Consulted  Neuro Dr. Bass Consulted
p/w dizziness associated with CP w/dyspnea  -Chest pain resolved after ketoralac in ED  -EKG NSR  - orthostatic BP noted  - Trop negative x2  - C/w Meclizine PRN, switch to prn when the vertigo improves.-neuro recc  - PT eval.  - pending echo , mri brain, f/u reports  - Cardio Dr Barnard recc: ECHO  - Neuro Dr. Bass recc:  MRI brain.  IF there is acute stroke,--> MRA head, CD, TTE, LDL, start asa, plavix and statin for secondary stroke prevention.
with head movement and lateral eye movement  CTH neg  trop negx2  F/U ISAAC  F/U MRI Brain noncon  Neuro Dr. Bass  Cardiology Dr. Barnard

## 2022-11-17 NOTE — PHYSICAL THERAPY INITIAL EVALUATION ADULT - PERTINENT HX OF CURRENT PROBLEM, REHAB EVAL
Patient admitted from home due to worsening dizziness, chest pain and palpitation for the past 4 days. Patient states it was so severe that she almost fell on the street. Patient states uses reading glasses and  her last  Opthalmologist appointment was last year.

## 2022-11-17 NOTE — DISCHARGE NOTE PROVIDER - NSDCCPCAREPLAN_GEN_ALL_CORE_FT
PRINCIPAL DISCHARGE DIAGNOSIS  Diagnosis: Vertigo  Assessment and Plan of Treatment: Vertigo is a condition that causes you to feel dizzy. You may feel that you or everything around you is moving or spinning. You may also feel like you are being pulled down or toward your side.   Do not drive, walk without help, or operate heavy machinery when you are dizzy. Move slowly when you move from one position to another position. Get up slowly from sitting or lying down. Sit or lie down right away if you feel dizzy. Drink plenty of liquids. Liquids help prevent dehydration. Ask how much liquid to drink each day and which liquids are best for you. Vestibular and balance rehabilitation therapy (VBRT) is used to teach you exercises to improve your balance and strength. These exercises may help decrease your vertigo and improve your balance. Ask for more information about this therapy. Follow up with your doctor as directed      SECONDARY DISCHARGE DIAGNOSES  Diagnosis: HLD (hyperlipidemia)  Assessment and Plan of Treatment: Hyperlipidemia is a high level of lipids (fats) in your blood. These lipids include cholesterol or triglycerides. Lipids are made by your body. They also come from the foods you eat. Your body needs lipids to work properly, but high levels increase your risk for heart disease, heart attack, and stroke. You were started on aspirin to decrease your risk of having this occur. Follow up with your primary care doctor in 1 month to have you levels checked again    Diagnosis: Migraine  Assessment and Plan of Treatment: A migraine is a severe headache. The pain can be so severe that it interferes with your daily activities. A migraine can last a few hours up to several days. The exact cause of migraines is not known. A family history of migraines increases your risk. Your risk is also higher if you are a woman or take medicines such as estrogen or a vasodilator. You were seen by the neurologist and had an MRI which confirmed no acute pathology    Diagnosis: Transaminitis  Assessment and Plan of Treatment: This means your liver enzymes were elevated, it is important to avoid things like tylenol or other medications that are affect your liver. Follow up with your primary care doctor to have this level checked again

## 2022-11-17 NOTE — DISCHARGE NOTE NURSING/CASE MANAGEMENT/SOCIAL WORK - NSDCPEFALRISK_GEN_ALL_CORE
For information on Fall & Injury Prevention, visit: [email or web address] OR  [email or web address] OR  https://www.cdc.gov/steadi/patient.html

## 2023-02-10 PROBLEM — U07.1 COVID-19: Chronic | Status: ACTIVE | Noted: 2022-11-15

## 2023-02-14 ENCOUNTER — APPOINTMENT (OUTPATIENT)
Dept: UROLOGY | Facility: CLINIC | Age: 48
End: 2023-02-14

## 2024-10-24 NOTE — ED PROVIDER NOTE - PATIENT PORTAL LINK FT
Contacted lab for second set of cultures     Margaret Alvarez RN  10/23/24 5620     You can access the FollowMyHealth Patient Portal offered by [facility] by registering at the following website: [email or web address]. By joining [facility] FollowMyHealth portal, you will also be able to view your health information using other applications (apps) compatible with our system.
